# Patient Record
Sex: MALE | Race: ASIAN | NOT HISPANIC OR LATINO | URBAN - METROPOLITAN AREA
[De-identification: names, ages, dates, MRNs, and addresses within clinical notes are randomized per-mention and may not be internally consistent; named-entity substitution may affect disease eponyms.]

---

## 2024-05-31 ENCOUNTER — INPATIENT (INPATIENT)
Facility: HOSPITAL | Age: 74
LOS: 2 days | Discharge: HOME | DRG: 684 | End: 2024-06-03
Attending: INTERNAL MEDICINE | Admitting: STUDENT IN AN ORGANIZED HEALTH CARE EDUCATION/TRAINING PROGRAM
Payer: MEDICARE

## 2024-05-31 VITALS
SYSTOLIC BLOOD PRESSURE: 160 MMHG | HEART RATE: 83 BPM | HEIGHT: 67 IN | OXYGEN SATURATION: 95 % | RESPIRATION RATE: 16 BRPM | TEMPERATURE: 98 F | DIASTOLIC BLOOD PRESSURE: 83 MMHG | WEIGHT: 210.1 LBS

## 2024-05-31 DIAGNOSIS — Z79.02 LONG TERM (CURRENT) USE OF ANTITHROMBOTICS/ANTIPLATELETS: ICD-10-CM

## 2024-05-31 DIAGNOSIS — I44.0 ATRIOVENTRICULAR BLOCK, FIRST DEGREE: ICD-10-CM

## 2024-05-31 DIAGNOSIS — G25.81 RESTLESS LEGS SYNDROME: ICD-10-CM

## 2024-05-31 DIAGNOSIS — N18.4 CHRONIC KIDNEY DISEASE, STAGE 4 (SEVERE): ICD-10-CM

## 2024-05-31 DIAGNOSIS — Z79.84 LONG TERM (CURRENT) USE OF ORAL HYPOGLYCEMIC DRUGS: ICD-10-CM

## 2024-05-31 DIAGNOSIS — I25.10 ATHEROSCLEROTIC HEART DISEASE OF NATIVE CORONARY ARTERY WITHOUT ANGINA PECTORIS: ICD-10-CM

## 2024-05-31 DIAGNOSIS — I44.7 LEFT BUNDLE-BRANCH BLOCK, UNSPECIFIED: ICD-10-CM

## 2024-05-31 DIAGNOSIS — E11.22 TYPE 2 DIABETES MELLITUS WITH DIABETIC CHRONIC KIDNEY DISEASE: ICD-10-CM

## 2024-05-31 DIAGNOSIS — N17.9 ACUTE KIDNEY FAILURE, UNSPECIFIED: ICD-10-CM

## 2024-05-31 DIAGNOSIS — I12.9 HYPERTENSIVE CHRONIC KIDNEY DISEASE WITH STAGE 1 THROUGH STAGE 4 CHRONIC KIDNEY DISEASE, OR UNSPECIFIED CHRONIC KIDNEY DISEASE: ICD-10-CM

## 2024-05-31 DIAGNOSIS — N28.1 CYST OF KIDNEY, ACQUIRED: ICD-10-CM

## 2024-05-31 DIAGNOSIS — K59.00 CONSTIPATION, UNSPECIFIED: ICD-10-CM

## 2024-05-31 LAB
ALBUMIN SERPL ELPH-MCNC: 4.3 G/DL — SIGNIFICANT CHANGE UP (ref 3.5–5.2)
ALP SERPL-CCNC: 48 U/L — SIGNIFICANT CHANGE UP (ref 30–115)
ALT FLD-CCNC: 18 U/L — SIGNIFICANT CHANGE UP (ref 0–41)
ANION GAP SERPL CALC-SCNC: 13 MMOL/L — SIGNIFICANT CHANGE UP (ref 7–14)
APPEARANCE UR: CLEAR — SIGNIFICANT CHANGE UP
AST SERPL-CCNC: 21 U/L — SIGNIFICANT CHANGE UP (ref 0–41)
BASOPHILS # BLD AUTO: 0.03 K/UL — SIGNIFICANT CHANGE UP (ref 0–0.2)
BASOPHILS NFR BLD AUTO: 0.3 % — SIGNIFICANT CHANGE UP (ref 0–1)
BILIRUB SERPL-MCNC: 0.2 MG/DL — SIGNIFICANT CHANGE UP (ref 0.2–1.2)
BILIRUB UR-MCNC: NEGATIVE — SIGNIFICANT CHANGE UP
BUN SERPL-MCNC: 49 MG/DL — HIGH (ref 10–20)
CALCIUM SERPL-MCNC: 9.5 MG/DL — SIGNIFICANT CHANGE UP (ref 8.4–10.4)
CHLORIDE SERPL-SCNC: 102 MMOL/L — SIGNIFICANT CHANGE UP (ref 98–110)
CO2 SERPL-SCNC: 24 MMOL/L — SIGNIFICANT CHANGE UP (ref 17–32)
COLOR SPEC: YELLOW — SIGNIFICANT CHANGE UP
CREAT SERPL-MCNC: 2.7 MG/DL — HIGH (ref 0.7–1.5)
DIFF PNL FLD: NEGATIVE — SIGNIFICANT CHANGE UP
EGFR: 24 ML/MIN/1.73M2 — LOW
EOSINOPHIL # BLD AUTO: 0.2 K/UL — SIGNIFICANT CHANGE UP (ref 0–0.7)
EOSINOPHIL NFR BLD AUTO: 2.2 % — SIGNIFICANT CHANGE UP (ref 0–8)
GLUCOSE BLDC GLUCOMTR-MCNC: 174 MG/DL — HIGH (ref 70–99)
GLUCOSE SERPL-MCNC: 132 MG/DL — HIGH (ref 70–99)
GLUCOSE UR QL: 500 MG/DL
HCT VFR BLD CALC: 33.2 % — LOW (ref 42–52)
HGB BLD-MCNC: 10.7 G/DL — LOW (ref 14–18)
IMM GRANULOCYTES NFR BLD AUTO: 0.9 % — HIGH (ref 0.1–0.3)
KETONES UR-MCNC: NEGATIVE MG/DL — SIGNIFICANT CHANGE UP
LEUKOCYTE ESTERASE UR-ACNC: NEGATIVE — SIGNIFICANT CHANGE UP
LYMPHOCYTES # BLD AUTO: 1.33 K/UL — SIGNIFICANT CHANGE UP (ref 1.2–3.4)
LYMPHOCYTES # BLD AUTO: 15 % — LOW (ref 20.5–51.1)
MCHC RBC-ENTMCNC: 30.3 PG — SIGNIFICANT CHANGE UP (ref 27–31)
MCHC RBC-ENTMCNC: 32.2 G/DL — SIGNIFICANT CHANGE UP (ref 32–37)
MCV RBC AUTO: 94.1 FL — HIGH (ref 80–94)
MONOCYTES # BLD AUTO: 0.58 K/UL — SIGNIFICANT CHANGE UP (ref 0.1–0.6)
MONOCYTES NFR BLD AUTO: 6.5 % — SIGNIFICANT CHANGE UP (ref 1.7–9.3)
NEUTROPHILS # BLD AUTO: 6.67 K/UL — HIGH (ref 1.4–6.5)
NEUTROPHILS NFR BLD AUTO: 75.1 % — SIGNIFICANT CHANGE UP (ref 42.2–75.2)
NITRITE UR-MCNC: NEGATIVE — SIGNIFICANT CHANGE UP
NRBC # BLD: 0 /100 WBCS — SIGNIFICANT CHANGE UP (ref 0–0)
PH UR: 7 — SIGNIFICANT CHANGE UP (ref 5–8)
PLATELET # BLD AUTO: 236 K/UL — SIGNIFICANT CHANGE UP (ref 130–400)
PMV BLD: 10.3 FL — SIGNIFICANT CHANGE UP (ref 7.4–10.4)
POTASSIUM SERPL-MCNC: 4.3 MMOL/L — SIGNIFICANT CHANGE UP (ref 3.5–5)
POTASSIUM SERPL-SCNC: 4.3 MMOL/L — SIGNIFICANT CHANGE UP (ref 3.5–5)
PROT SERPL-MCNC: 6.7 G/DL — SIGNIFICANT CHANGE UP (ref 6–8)
PROT UR-MCNC: 100 MG/DL
RBC # BLD: 3.53 M/UL — LOW (ref 4.7–6.1)
RBC # FLD: 13.3 % — SIGNIFICANT CHANGE UP (ref 11.5–14.5)
SODIUM SERPL-SCNC: 139 MMOL/L — SIGNIFICANT CHANGE UP (ref 135–146)
SODIUM UR-SCNC: 57 MMOL/L — SIGNIFICANT CHANGE UP
SP GR SPEC: 1.01 — SIGNIFICANT CHANGE UP (ref 1–1.03)
UROBILINOGEN FLD QL: 0.2 MG/DL — SIGNIFICANT CHANGE UP (ref 0.2–1)
WBC # BLD: 8.89 K/UL — SIGNIFICANT CHANGE UP (ref 4.8–10.8)
WBC # FLD AUTO: 8.89 K/UL — SIGNIFICANT CHANGE UP (ref 4.8–10.8)

## 2024-05-31 PROCEDURE — 76770 US EXAM ABDO BACK WALL COMP: CPT

## 2024-05-31 PROCEDURE — 93970 EXTREMITY STUDY: CPT | Mod: 26

## 2024-05-31 PROCEDURE — 84156 ASSAY OF PROTEIN URINE: CPT

## 2024-05-31 PROCEDURE — 71045 X-RAY EXAM CHEST 1 VIEW: CPT | Mod: 26

## 2024-05-31 PROCEDURE — G0378: CPT

## 2024-05-31 PROCEDURE — 85027 COMPLETE CBC AUTOMATED: CPT

## 2024-05-31 PROCEDURE — 73600 X-RAY EXAM OF ANKLE: CPT | Mod: RT

## 2024-05-31 PROCEDURE — 99223 1ST HOSP IP/OBS HIGH 75: CPT

## 2024-05-31 PROCEDURE — 84443 ASSAY THYROID STIM HORMONE: CPT

## 2024-05-31 PROCEDURE — 84100 ASSAY OF PHOSPHORUS: CPT

## 2024-05-31 PROCEDURE — 84155 ASSAY OF PROTEIN SERUM: CPT

## 2024-05-31 PROCEDURE — 81001 URINALYSIS AUTO W/SCOPE: CPT

## 2024-05-31 PROCEDURE — 82330 ASSAY OF CALCIUM: CPT

## 2024-05-31 PROCEDURE — 82310 ASSAY OF CALCIUM: CPT

## 2024-05-31 PROCEDURE — 84133 ASSAY OF URINE POTASSIUM: CPT

## 2024-05-31 PROCEDURE — 84300 ASSAY OF URINE SODIUM: CPT

## 2024-05-31 PROCEDURE — 83935 ASSAY OF URINE OSMOLALITY: CPT

## 2024-05-31 PROCEDURE — 73590 X-RAY EXAM OF LOWER LEG: CPT | Mod: RT

## 2024-05-31 PROCEDURE — 82962 GLUCOSE BLOOD TEST: CPT

## 2024-05-31 PROCEDURE — 83036 HEMOGLOBIN GLYCOSYLATED A1C: CPT

## 2024-05-31 PROCEDURE — 83970 ASSAY OF PARATHORMONE: CPT

## 2024-05-31 PROCEDURE — 83735 ASSAY OF MAGNESIUM: CPT

## 2024-05-31 PROCEDURE — 99285 EMERGENCY DEPT VISIT HI MDM: CPT

## 2024-05-31 PROCEDURE — 73560 X-RAY EXAM OF KNEE 1 OR 2: CPT | Mod: RT

## 2024-05-31 PROCEDURE — 83540 ASSAY OF IRON: CPT

## 2024-05-31 PROCEDURE — 83550 IRON BINDING TEST: CPT

## 2024-05-31 PROCEDURE — 84540 ASSAY OF URINE/UREA-N: CPT

## 2024-05-31 PROCEDURE — 86334 IMMUNOFIX E-PHORESIS SERUM: CPT

## 2024-05-31 PROCEDURE — 80048 BASIC METABOLIC PNL TOTAL CA: CPT

## 2024-05-31 PROCEDURE — 82570 ASSAY OF URINE CREATININE: CPT

## 2024-05-31 PROCEDURE — 84165 PROTEIN E-PHORESIS SERUM: CPT

## 2024-05-31 PROCEDURE — 80053 COMPREHEN METABOLIC PANEL: CPT

## 2024-05-31 PROCEDURE — 85379 FIBRIN DEGRADATION QUANT: CPT

## 2024-05-31 PROCEDURE — 82784 ASSAY IGA/IGD/IGG/IGM EACH: CPT

## 2024-05-31 PROCEDURE — 36415 COLL VENOUS BLD VENIPUNCTURE: CPT

## 2024-05-31 RX ORDER — DOXAZOSIN MESYLATE 4 MG
4 TABLET ORAL DAILY
Refills: 0 | Status: DISCONTINUED | OUTPATIENT
Start: 2024-05-31 | End: 2024-06-03

## 2024-05-31 RX ORDER — SODIUM CHLORIDE 9 MG/ML
1000 INJECTION, SOLUTION INTRAVENOUS
Refills: 0 | Status: DISCONTINUED | OUTPATIENT
Start: 2024-05-31 | End: 2024-06-03

## 2024-05-31 RX ORDER — BRIMONIDINE TARTRATE 2 MG/MG
1 SOLUTION/ DROPS OPHTHALMIC DAILY
Refills: 0 | Status: DISCONTINUED | OUTPATIENT
Start: 2024-05-31 | End: 2024-06-03

## 2024-05-31 RX ORDER — PREDNISOLONE SODIUM PHOSPHATE 1 %
1 DROPS OPHTHALMIC (EYE) DAILY
Refills: 0 | Status: DISCONTINUED | OUTPATIENT
Start: 2024-05-31 | End: 2024-06-03

## 2024-05-31 RX ORDER — CLOPIDOGREL BISULFATE 75 MG/1
75 TABLET, FILM COATED ORAL DAILY
Refills: 0 | Status: DISCONTINUED | OUTPATIENT
Start: 2024-06-01 | End: 2024-06-03

## 2024-05-31 RX ORDER — DEXTROSE 10 % IN WATER 10 %
125 INTRAVENOUS SOLUTION INTRAVENOUS ONCE
Refills: 0 | Status: DISCONTINUED | OUTPATIENT
Start: 2024-05-31 | End: 2024-06-03

## 2024-05-31 RX ORDER — SODIUM CHLORIDE 9 MG/ML
1000 INJECTION INTRAMUSCULAR; INTRAVENOUS; SUBCUTANEOUS ONCE
Refills: 0 | Status: COMPLETED | OUTPATIENT
Start: 2024-05-31 | End: 2024-05-31

## 2024-05-31 RX ORDER — HYDRALAZINE HCL 50 MG
100 TABLET ORAL EVERY 8 HOURS
Refills: 0 | Status: DISCONTINUED | OUTPATIENT
Start: 2024-05-31 | End: 2024-06-03

## 2024-05-31 RX ORDER — INSULIN LISPRO 100/ML
VIAL (ML) SUBCUTANEOUS AT BEDTIME
Refills: 0 | Status: DISCONTINUED | OUTPATIENT
Start: 2024-05-31 | End: 2024-06-03

## 2024-05-31 RX ORDER — DEXTROSE 50 % IN WATER 50 %
15 SYRINGE (ML) INTRAVENOUS ONCE
Refills: 0 | Status: DISCONTINUED | OUTPATIENT
Start: 2024-05-31 | End: 2024-06-03

## 2024-05-31 RX ORDER — ACETAMINOPHEN 500 MG
650 TABLET ORAL EVERY 6 HOURS
Refills: 0 | Status: DISCONTINUED | OUTPATIENT
Start: 2024-05-31 | End: 2024-06-03

## 2024-05-31 RX ORDER — INSULIN LISPRO 100/ML
VIAL (ML) SUBCUTANEOUS
Refills: 0 | Status: DISCONTINUED | OUTPATIENT
Start: 2024-05-31 | End: 2024-06-03

## 2024-05-31 RX ORDER — HEPARIN SODIUM 5000 [USP'U]/ML
5000 INJECTION INTRAVENOUS; SUBCUTANEOUS EVERY 8 HOURS
Refills: 0 | Status: DISCONTINUED | OUTPATIENT
Start: 2024-05-31 | End: 2024-06-03

## 2024-05-31 RX ORDER — DEXTROSE 50 % IN WATER 50 %
12.5 SYRINGE (ML) INTRAVENOUS ONCE
Refills: 0 | Status: DISCONTINUED | OUTPATIENT
Start: 2024-05-31 | End: 2024-06-03

## 2024-05-31 RX ORDER — DEXTROSE 50 % IN WATER 50 %
25 SYRINGE (ML) INTRAVENOUS ONCE
Refills: 0 | Status: DISCONTINUED | OUTPATIENT
Start: 2024-05-31 | End: 2024-06-03

## 2024-05-31 RX ORDER — GLUCAGON INJECTION, SOLUTION 0.5 MG/.1ML
1 INJECTION, SOLUTION SUBCUTANEOUS ONCE
Refills: 0 | Status: DISCONTINUED | OUTPATIENT
Start: 2024-05-31 | End: 2024-06-03

## 2024-05-31 RX ORDER — ATORVASTATIN CALCIUM 80 MG/1
40 TABLET, FILM COATED ORAL AT BEDTIME
Refills: 0 | Status: DISCONTINUED | OUTPATIENT
Start: 2024-05-31 | End: 2024-06-03

## 2024-05-31 RX ADMIN — SODIUM CHLORIDE 1000 MILLILITER(S): 9 INJECTION INTRAMUSCULAR; INTRAVENOUS; SUBCUTANEOUS at 17:39

## 2024-05-31 NOTE — ED PROVIDER NOTE - OBJECTIVE STATEMENT
Chart(s)/Patient/Physician/Provider 75 yo male with a pmh of htn, cad, dm, ckd sent in for rosalind. pt states to note R foot swelling for 2 day and SOB at night. pt states he is producing urine. pt denies any other symptoms including fevers, chill, headache, recent illness/travel, cough, abdominal pain, chest pain.

## 2024-05-31 NOTE — ED PROVIDER NOTE - PHYSICAL EXAMINATION
Gen: NAD, AOx3  Head: NCAT  HEENT: PERRL, oral mucosa moist, normal conjunctiva, oropharynx clear without exudate or erythema  Lung: CTAB, no respiratory distress, no wheezing, rales, rhonchi  CV: normal s1/s2, rrr, Normal perfusion, pulses 2+ throughout  Abd: soft, NTND, no CVA tenderness  Genitourinary: no pelvic tenderness  MSK: No edema, no visible deformities, full range of motion in all 4 extremities  Neuro: CN II-XII grossly intact, No focal neurologic deficits   Skin: No rash   Psych: normal affect

## 2024-05-31 NOTE — H&P ADULT - HISTORY OF PRESENT ILLNESS
74yoM hx htn, CAD, DM, CKD here for JOSE and RLE pain. Pt sent from PCP for abnormal creatinine, son to bring lab work in AM currently unable to give baseline value or most recent outpt lab values. Pt also has been experiencing R calf pains and swelling for two days as well as some shortness of breath overnight.     Labs significant for Hg 10.7, MCV 94.1, Cr 2.7, BUN 49    Imaging: CXR low volume but clear, VA duplex LE negative for DVT per ED    EKst degree AVB, incomplete LBBB    Pt ordered for 1L NS bolus by ED    Vitals  T(C): 36.4 (24 @ 20:18), Max: 36.7 (24 @ 16:21)  T(F): 97.5 (24 @ 20:18), Max: 98.1 (24 @ 16:21)  HR: 76 (24 @ 20:18) (76 - 83)  BP: 160/88 (24 @ 20:18) (160/83 - 160/88)  RR: 19 (24 @ 20:18) (16 - 19)  SpO2: 95% (24 @ 20:18) (95% - 95%)  Wt(kg): --    Review of Systems  CONSTITUTIONAL:  No weakness, fevers or chills  EYES/ENT:  No visual changes;  No vertigo or throat pain   NECK:  No pain or stiffness  RESPIRATORY:  No cough, wheezing, hemoptysis; No shortness of breath  CARDIOVASCULAR:  No chest pain or palpitations  GASTROINTESTINAL:  No abdominal or epigastric pain. No nausea, vomiting, or hematemesis; No diarrhea or constipation. No melena or hematochezia.  GENITOURINARY:  No dysuria, frequency or hematuria  MUSCULOSKELETAL: Right calf tightness/pain  NEUROLOGICAL:  No numbness or weakness  SKIN:  No itching, rashes      Physical Exam  GENERAL: AAOx4. Well-nourished,sitting at edge of bed appearing in no acute distress  HEENT: No FNDs, atraumatic, normocephalic, moist mucus membranes  LUNGS: Clear to auscultation bilaterally  HEART: S1/S2. No heaves or thrills  ABD: Soft, non-tender, non-distended.  EXT/NEURO: TTP over R posterior LEStrength, sensation, ROM grossly intact  SKIN: No breaks, erythema, edema   74yoM hx htn, CAD, DM, CKD here for JOSE and RLE pain. Pt sent from PCP for abnormal creatinine, son to bring lab work in AM currently unable to give baseline value or most recent outpt lab values. When questioned, pt uncertain of lab vaules but said that he was told his function has been "level 4" for the past three months but is now "level 5". Pt also has been experiencing R calf pains and swelling for two days, denies falls, trauma.    Labs significant for Hg 10.7, MCV 94.1, Cr 2.7, BUN 49    Imaging: CXR low volume but clear, VA duplex LE negative for DVT per ED    EKst degree AVB, incomplete LBBB    Pt ordered for 1L NS bolus by ED    Vitals  T(C): 36.4 (-- @ 20:18), Max: 36.7 (-24 @ 16:21)  T(F): 97.5 (- @ 20:18), Max: 98.1 (-24 @ 16:21)  HR: 76 (-24 @ 20:18) (76 - 83)  BP: 160/88 (--24 @ 20:18) (160/83 - 160/88)  RR: 19 (-24 @ 20:18) (16 - 19)  SpO2: 95% (-24 @ 20:18) (95% - 95%)  Wt(kg): --    Review of Systems  CONSTITUTIONAL:  No weakness, fevers or chills  EYES/ENT:  No visual changes;  No vertigo or throat pain   NECK:  No pain or stiffness  RESPIRATORY:  No cough, wheezing, hemoptysis; No shortness of breath  CARDIOVASCULAR:  No chest pain or palpitations  GASTROINTESTINAL:  No abdominal or epigastric pain. No nausea, vomiting, or hematemesis; No diarrhea or constipation. No melena or hematochezia.  GENITOURINARY:  No dysuria, frequency or hematuria  MUSCULOSKELETAL: Right calf tightness/pain  NEUROLOGICAL:  No numbness or weakness  SKIN:  No itching, rashes      Physical Exam  GENERAL: AAOx4. Well-nourished,sitting at edge of bed appearing in no acute distress  HEENT: No FNDs, atraumatic, normocephalic, moist mucus membranes  LUNGS: Clear to auscultation bilaterally  HEART: S1/S2. No heaves or thrills  ABD: Distended but soft, non-tender  EXT/NEURO: TTP over R posterior calf. No pain with ROM. Strength, sensation, ROM grossly intact  SKIN: 2-3cm skin lesion overlying xyphoid process of chest. Asymmetrical, uneven border, multi color, unraised

## 2024-05-31 NOTE — H&P ADULT - NSHPLABSRESULTS_GEN_ALL_CORE
CBC Full  -  ( 31 May 2024 17:40 )  WBC Count : 8.89 K/uL  RBC Count : 3.53 M/uL  Hemoglobin : 10.7 g/dL  Hematocrit : 33.2 %  Platelet Count - Automated : 236 K/uL  Mean Cell Volume : 94.1 fL  Mean Cell Hemoglobin : 30.3 pg  Mean Cell Hemoglobin Concentration : 32.2 g/dL  Auto Neutrophil # : 6.67 K/uL  Auto Lymphocyte # : 1.33 K/uL  Auto Monocyte # : 0.58 K/uL  Auto Eosinophil # : 0.20 K/uL  Auto Basophil # : 0.03 K/uL  Auto Neutrophil % : 75.1 %  Auto Lymphocyte % : 15.0 %  Auto Monocyte % : 6.5 %  Auto Eosinophil % : 2.2 %  Auto Basophil % : 0.3 %        139  |  102  |  49<H>  ----------------------------<  132<H>  4.3   |  24  |  2.7<H>    Ca    9.5      31 May 2024 17:40    TPro  6.7  /  Alb  4.3  /  TBili  0.2  /  DBili  x   /  AST  21  /  ALT  18  /  AlkPhos  48      LIVER FUNCTIONS - ( 31 May 2024 17:40 )  Alb: 4.3 g/dL / Pro: 6.7 g/dL / ALK PHOS: 48 U/L / ALT: 18 U/L / AST: 21 U/L / GGT: x           Urinalysis Basic - ( 31 May 2024 20:06 )    Color: Yellow / Appearance: Clear / S.012 / pH: x  Gluc: x / Ketone: Negative mg/dL  / Bili: Negative / Urobili: 0.2 mg/dL   Blood: x / Protein: 100 mg/dL / Nitrite: Negative   Leuk Esterase: Negative / RBC: 0 /HPF / WBC 0 /HPF   Sq Epi: x / Non Sq Epi: 0 /HPF / Bacteria: Negative /HPF

## 2024-05-31 NOTE — H&P ADULT - ATTENDING COMMENTS
74yoM hx htn, CAD, DM, CKD here for JOSE and RLE pain.    Agree  with assessment  except for changes below.   Vital Signs Last 24 Hrs  T(C): 36.4 (31 May 2024 20:18), Max: 36.7 (31 May 2024 16:21)  T(F): 97.5 (31 May 2024 20:18), Max: 98.1 (31 May 2024 16:21)  HR: 76 (31 May 2024 20:18) (76 - 83)  BP: 160/88 (31 May 2024 20:18) (160/83 - 160/88)  BP(mean): --  RR: 19 (31 May 2024 20:18) (16 - 19)  SpO2: 95% (31 May 2024 20:18) (95% - 95%)    Parameters below as of 31 May 2024 20:18  Patient On (Oxygen Delivery Method): room air      IMPRESSION  JOSE  Baseline  Unknown CKD   Baseline creatinine: Unknown   Creatinine today 2.7 , Consider Renal US,  Avoid nephrotoxic agents, Monitor BUN/creatinine, Send  Urine Lytes   Holding Farxiga, losartan, other PO anti-glycemics for now  s/p IVF    RLE pain  - Pain  control   -mainly with palpation and walking  -f/u VA duplx  -f/u XRay    Hx CAD  Hx Essential Hypertension  -confirm med rec, hydralazine and doxazosin for htn for now  - Hold  Losartan   -c/w Plavix 74yoM hx htn, CAD, DM, CKD here for JOSE and RLE pain.    Agree  with assessment  except for changes below.   Vital Signs Last 24 Hrs  T(C): 36.4 (31 May 2024 20:18), Max: 36.7 (31 May 2024 16:21)  T(F): 97.5 (31 May 2024 20:18), Max: 98.1 (31 May 2024 16:21)  HR: 76 (31 May 2024 20:18) (76 - 83)  BP: 160/88 (31 May 2024 20:18) (160/83 - 160/88)  BP(mean): --  RR: 19 (31 May 2024 20:18) (16 - 19)  SpO2: 95% (31 May 2024 20:18) (95% - 95%)    Parameters below as of 31 May 2024 20:18  Patient On (Oxygen Delivery Method): room air    PHYSICAL EXAM  GENERAL: NAD,  HEAD:  NCAT, EOMI, MM  NECK: Supple, Nontender  NERVOUS SYSTEM:  AAOx3, NFD  CHEST/LUNG: +bs b/l, No wheezing   HEART: +s1s2 RRR  ABDOMEN: soft, NT/ND  EXTREMITIES:  pp, no edema, NO tendernes on palpation lower Extremity  SKIN: age related skin changes       IMPRESSION  JOSE  Baseline  Unknown CKD   Baseline creatinine: Unknown   Creatinine today 2.7 , Consider Renal US,  Avoid nephrotoxic agents, Monitor BUN/creatinine, Send  Urine Lytes   Holding Farxiga, losartan, other PO anti-glycemics for now  s/p IVF    RLE pain  - Pain  control   -mainly with palpation and walking  -f/u VA duplx  -f/u XRay    Hx CAD  Hx Essential Hypertension  -confirm med rec, hydralazine and doxazosin for htn for now  - Hold  Losartan   -c/w Plavix    Seen on 05/31

## 2024-05-31 NOTE — ED PROVIDER NOTE - CLINICAL SUMMARY MEDICAL DECISION MAKING FREE TEXT BOX
74-year-old male history of hypertension, CAD, diabetes, CKD sent to the ED by his outpatient doctor for JOSE.  Reportedly his physician wanted patient admitted for further workup.  Patient also noting right foot swelling for 2 days and states that he feels somewhat short of breath at night.  Made good urine output.  No other associated symptoms.    Agree with exam as documented by PA above.  Additionally there is no edema to his lower extremities contrary to his complaint of this.    Chest x-ray was clear.  Labs were obtained which showed elevated creatinine.  Potassium not markedly elevated.  A venous duplex was obtained which did not show any DVT.  Patient was admitted to evaluate his JOSE and manage.

## 2024-05-31 NOTE — PATIENT PROFILE ADULT - FUNCTIONAL SCREEN CURRENT LEVEL: COMMUNICATION, MLM
Pt BIBA from a bar for AMS. Pt admits to alcohol use, denies drug use. Pt vomited 1x PTA. No visible s/s of trauma. 0 = understands/communicates without difficulty

## 2024-05-31 NOTE — ED ADULT NURSE NOTE - NSFALLRISKINTERV_ED_ALL_ED
Assistance OOB with selected safe patient handling equipment if applicable/Assistance with ambulation/Communicate fall risk and risk factors to all staff, patient, and family/Monitor gait and stability/Provide visual cue: yellow wristband, yellow gown, etc/Reinforce activity limits and safety measures with patient and family/Call bell, personal items and telephone in reach/Instruct patient to call for assistance before getting out of bed/chair/stretcher/Non-slip footwear applied when patient is off stretcher/[place] to call system/Physically safe environment - no spills, clutter or unnecessary equipment/Purposeful Proactive Rounding/Room/bathroom lighting operational, light cord in reach

## 2024-05-31 NOTE — H&P ADULT - ASSESSMENT
74yoM hx htn, CAD, DM, CKD here for JOSE and RLE pain.    #JOSE on unknown baseline CKD  -f/u urine studies, RBUS    #CAD  #Essential Hypertension  -c/w home medications as tolerated    #DVT ppx  -HSQ  -VA duplx  -D-dimer    #GI ppx  -N/A    #Diet  #Diabetes Mellitus  -dash/tlc | CC  -fluid restrict 1500cc    #Activity  -IAT 74yoM hx htn, CAD, DM, CKD here for JOSE and RLE pain.    **Med rec partial completed with pt, confirmed most recent prescriptions 5/29/24 were started, Pt xuan Mcguire [phone] to bring labs and med list in AM**  #JOSE on unknown baseline CKD  -speaking with pt, appears he is atleast CKD 4 and outpt dr concerned he is now CKD 5  -collateral can be obtained from outpt nephro Bharathi Henderson [phone], service nephro eval for now  -holding farxiga, losartan, other PO antiglycemics for now  -f/u urine studies, RBUS    #RLE pain  -mainly with palpation and walking  -f/u VA duplx  -f/u XRay    #CAD  #Essential Hypertension  -confirm med rec, hydralazine and doxazosin for htn for now  -c/w plavix    #DVT ppx  -HSQ  -VA duplx  -D-dimer    #GI ppx  -N/A    #Diet  #Diabetes Mellitus  -dash/tlc | CC  -fluid restrict 1500cc    #Activity  -IAT

## 2024-06-01 LAB
A1C WITH ESTIMATED AVERAGE GLUCOSE RESULT: 7.1 % — HIGH (ref 4–5.6)
ALBUMIN SERPL ELPH-MCNC: 4.4 G/DL — SIGNIFICANT CHANGE UP (ref 3.5–5.2)
ALP SERPL-CCNC: 49 U/L — SIGNIFICANT CHANGE UP (ref 30–115)
ALT FLD-CCNC: 16 U/L — SIGNIFICANT CHANGE UP (ref 0–41)
ANION GAP SERPL CALC-SCNC: 12 MMOL/L — SIGNIFICANT CHANGE UP (ref 7–14)
AST SERPL-CCNC: 18 U/L — SIGNIFICANT CHANGE UP (ref 0–41)
BILIRUB SERPL-MCNC: 0.3 MG/DL — SIGNIFICANT CHANGE UP (ref 0.2–1.2)
BUN SERPL-MCNC: 44 MG/DL — HIGH (ref 10–20)
CALCIUM SERPL-MCNC: 9.3 MG/DL — SIGNIFICANT CHANGE UP (ref 8.4–10.5)
CALCIUM SERPL-MCNC: 9.6 MG/DL — SIGNIFICANT CHANGE UP (ref 8.4–10.5)
CHLORIDE SERPL-SCNC: 109 MMOL/L — SIGNIFICANT CHANGE UP (ref 98–110)
CO2 SERPL-SCNC: 25 MMOL/L — SIGNIFICANT CHANGE UP (ref 17–32)
CREAT ?TM UR-MCNC: 38 MG/DL — SIGNIFICANT CHANGE UP
CREAT SERPL-MCNC: 2.5 MG/DL — HIGH (ref 0.7–1.5)
D DIMER BLD IA.RAPID-MCNC: 250 NG/ML DDU — HIGH
EGFR: 27 ML/MIN/1.73M2 — LOW
ESTIMATED AVERAGE GLUCOSE: 157 MG/DL — HIGH (ref 68–114)
GLUCOSE BLDC GLUCOMTR-MCNC: 115 MG/DL — HIGH (ref 70–99)
GLUCOSE BLDC GLUCOMTR-MCNC: 131 MG/DL — HIGH (ref 70–99)
GLUCOSE BLDC GLUCOMTR-MCNC: 172 MG/DL — HIGH (ref 70–99)
GLUCOSE BLDC GLUCOMTR-MCNC: 209 MG/DL — HIGH (ref 70–99)
GLUCOSE SERPL-MCNC: 105 MG/DL — HIGH (ref 70–99)
HCT VFR BLD CALC: 33.2 % — LOW (ref 42–52)
HGB BLD-MCNC: 10.9 G/DL — LOW (ref 14–18)
MAGNESIUM SERPL-MCNC: 2.2 MG/DL — SIGNIFICANT CHANGE UP (ref 1.8–2.4)
MCHC RBC-ENTMCNC: 30.5 PG — SIGNIFICANT CHANGE UP (ref 27–31)
MCHC RBC-ENTMCNC: 32.8 G/DL — SIGNIFICANT CHANGE UP (ref 32–37)
MCV RBC AUTO: 93 FL — SIGNIFICANT CHANGE UP (ref 80–94)
NRBC # BLD: 0 /100 WBCS — SIGNIFICANT CHANGE UP (ref 0–0)
OSMOLALITY UR: 469 MOS/KG — SIGNIFICANT CHANGE UP (ref 50–1200)
PHOSPHATE SERPL-MCNC: 3.5 MG/DL — SIGNIFICANT CHANGE UP (ref 2.1–4.9)
PLATELET # BLD AUTO: 237 K/UL — SIGNIFICANT CHANGE UP (ref 130–400)
PMV BLD: 10.6 FL — HIGH (ref 7.4–10.4)
POTASSIUM SERPL-MCNC: 4.3 MMOL/L — SIGNIFICANT CHANGE UP (ref 3.5–5)
POTASSIUM SERPL-SCNC: 4.3 MMOL/L — SIGNIFICANT CHANGE UP (ref 3.5–5)
POTASSIUM UR-SCNC: 24 MMOL/L — SIGNIFICANT CHANGE UP
PROT ?TM UR-MCNC: 98 MG/DLG/24H — SIGNIFICANT CHANGE UP
PROT SERPL-MCNC: 6.6 G/DL — SIGNIFICANT CHANGE UP (ref 6–8.3)
PROT SERPL-MCNC: 6.9 G/DL — SIGNIFICANT CHANGE UP (ref 6–8)
PROT/CREAT UR-RTO: 2.6 RATIO — HIGH (ref 0–0.2)
PTH-INTACT FLD-MCNC: 40 PG/ML — SIGNIFICANT CHANGE UP (ref 15–65)
RBC # BLD: 3.57 M/UL — LOW (ref 4.7–6.1)
RBC # FLD: 13.2 % — SIGNIFICANT CHANGE UP (ref 11.5–14.5)
SODIUM SERPL-SCNC: 146 MMOL/L — SIGNIFICANT CHANGE UP (ref 135–146)
TSH SERPL-MCNC: 1.27 UIU/ML — SIGNIFICANT CHANGE UP (ref 0.27–4.2)
UUN UR-MCNC: 662 MG/DL — SIGNIFICANT CHANGE UP
WBC # BLD: 6.21 K/UL — SIGNIFICANT CHANGE UP (ref 4.8–10.8)
WBC # FLD AUTO: 6.21 K/UL — SIGNIFICANT CHANGE UP (ref 4.8–10.8)

## 2024-06-01 PROCEDURE — 76770 US EXAM ABDO BACK WALL COMP: CPT | Mod: 26

## 2024-06-01 PROCEDURE — 73560 X-RAY EXAM OF KNEE 1 OR 2: CPT | Mod: 26,RT

## 2024-06-01 PROCEDURE — 99232 SBSQ HOSP IP/OBS MODERATE 35: CPT

## 2024-06-01 PROCEDURE — 73590 X-RAY EXAM OF LOWER LEG: CPT | Mod: 26,RT

## 2024-06-01 PROCEDURE — 73600 X-RAY EXAM OF ANKLE: CPT | Mod: 26,RT

## 2024-06-01 RX ORDER — AMLODIPINE BESYLATE 2.5 MG/1
1 TABLET ORAL
Refills: 0 | DISCHARGE

## 2024-06-01 RX ORDER — ALLOPURINOL 300 MG
100 TABLET ORAL DAILY
Refills: 0 | Status: DISCONTINUED | OUTPATIENT
Start: 2024-06-01 | End: 2024-06-03

## 2024-06-01 RX ORDER — SEMAGLUTIDE 0.68 MG/ML
1 INJECTION, SOLUTION SUBCUTANEOUS
Refills: 0 | DISCHARGE

## 2024-06-01 RX ORDER — TAMSULOSIN HYDROCHLORIDE 0.4 MG/1
1 CAPSULE ORAL
Refills: 0 | DISCHARGE

## 2024-06-01 RX ORDER — NEBIVOLOL HYDROCHLORIDE 5 MG/1
1 TABLET ORAL
Refills: 0 | DISCHARGE

## 2024-06-01 RX ORDER — DOXAZOSIN MESYLATE 4 MG
1 TABLET ORAL
Refills: 0 | DISCHARGE

## 2024-06-01 RX ORDER — ATORVASTATIN CALCIUM 80 MG/1
1 TABLET, FILM COATED ORAL
Refills: 0 | DISCHARGE

## 2024-06-01 RX ORDER — ALLOPURINOL 300 MG
1 TABLET ORAL
Refills: 0 | DISCHARGE

## 2024-06-01 RX ORDER — HYDRALAZINE HCL 50 MG
1 TABLET ORAL
Refills: 0 | DISCHARGE

## 2024-06-01 RX ORDER — SODIUM CHLORIDE 9 MG/ML
1000 INJECTION, SOLUTION INTRAVENOUS
Refills: 0 | Status: DISCONTINUED | OUTPATIENT
Start: 2024-06-01 | End: 2024-06-02

## 2024-06-01 RX ORDER — AMLODIPINE BESYLATE 2.5 MG/1
10 TABLET ORAL DAILY
Refills: 0 | Status: DISCONTINUED | OUTPATIENT
Start: 2024-06-01 | End: 2024-06-03

## 2024-06-01 RX ORDER — DAPAGLIFLOZIN 10 MG/1
1 TABLET, FILM COATED ORAL
Refills: 0 | DISCHARGE

## 2024-06-01 RX ORDER — LINAGLIPTIN 5 MG/1
1 TABLET, FILM COATED ORAL
Refills: 0 | DISCHARGE

## 2024-06-01 RX ORDER — SODIUM CHLORIDE 9 MG/ML
1000 INJECTION INTRAMUSCULAR; INTRAVENOUS; SUBCUTANEOUS
Refills: 0 | Status: DISCONTINUED | OUTPATIENT
Start: 2024-06-01 | End: 2024-06-01

## 2024-06-01 RX ORDER — PIOGLITAZONE HYDROCHLORIDE 15 MG/1
1 TABLET ORAL
Refills: 0 | DISCHARGE

## 2024-06-01 RX ORDER — PREDNISOLONE SODIUM PHOSPHATE 1 %
1 DROPS OPHTHALMIC (EYE)
Refills: 0 | DISCHARGE

## 2024-06-01 RX ORDER — BRIMONIDINE TARTRATE 2 MG/MG
1 SOLUTION/ DROPS OPHTHALMIC
Refills: 0 | DISCHARGE

## 2024-06-01 RX ORDER — CLOPIDOGREL BISULFATE 75 MG/1
1 TABLET, FILM COATED ORAL
Refills: 0 | DISCHARGE

## 2024-06-01 RX ORDER — SODIUM BICARBONATE 1 MEQ/ML
650 SYRINGE (ML) INTRAVENOUS THREE TIMES A DAY
Refills: 0 | Status: DISCONTINUED | OUTPATIENT
Start: 2024-06-01 | End: 2024-06-03

## 2024-06-01 RX ORDER — ISOSORBIDE DINITRATE 5 MG/1
3 TABLET ORAL
Refills: 0 | DISCHARGE

## 2024-06-01 RX ORDER — PRAMIPEXOLE DIHYDROCHLORIDE 0.12 MG/1
0.12 TABLET ORAL AT BEDTIME
Refills: 0 | Status: DISCONTINUED | OUTPATIENT
Start: 2024-06-01 | End: 2024-06-03

## 2024-06-01 RX ADMIN — Medication 100 MILLIGRAM(S): at 00:03

## 2024-06-01 RX ADMIN — Medication 100 MILLIGRAM(S): at 05:41

## 2024-06-01 RX ADMIN — Medication 100 MILLIGRAM(S): at 13:41

## 2024-06-01 RX ADMIN — Medication 650 MILLIGRAM(S): at 21:44

## 2024-06-01 RX ADMIN — Medication 650 MILLIGRAM(S): at 08:01

## 2024-06-01 RX ADMIN — Medication 650 MILLIGRAM(S): at 00:01

## 2024-06-01 RX ADMIN — Medication 650 MILLIGRAM(S): at 22:30

## 2024-06-01 RX ADMIN — Medication 650 MILLIGRAM(S): at 21:45

## 2024-06-01 RX ADMIN — PRAMIPEXOLE DIHYDROCHLORIDE 0.12 MILLIGRAM(S): 0.12 TABLET ORAL at 21:50

## 2024-06-01 RX ADMIN — Medication 1: at 17:33

## 2024-06-01 RX ADMIN — Medication 650 MILLIGRAM(S): at 07:31

## 2024-06-01 RX ADMIN — SODIUM CHLORIDE 100 MILLILITER(S): 9 INJECTION, SOLUTION INTRAVENOUS at 11:54

## 2024-06-01 RX ADMIN — ATORVASTATIN CALCIUM 40 MILLIGRAM(S): 80 TABLET, FILM COATED ORAL at 21:44

## 2024-06-01 RX ADMIN — Medication 100 MILLIGRAM(S): at 11:26

## 2024-06-01 RX ADMIN — Medication 4 MILLIGRAM(S): at 05:41

## 2024-06-01 RX ADMIN — HEPARIN SODIUM 5000 UNIT(S): 5000 INJECTION INTRAVENOUS; SUBCUTANEOUS at 05:41

## 2024-06-01 RX ADMIN — Medication 650 MILLIGRAM(S): at 00:42

## 2024-06-01 RX ADMIN — SODIUM CHLORIDE 100 MILLILITER(S): 9 INJECTION, SOLUTION INTRAVENOUS at 23:04

## 2024-06-01 RX ADMIN — SODIUM CHLORIDE 100 MILLILITER(S): 9 INJECTION INTRAMUSCULAR; INTRAVENOUS; SUBCUTANEOUS at 10:58

## 2024-06-01 RX ADMIN — HEPARIN SODIUM 5000 UNIT(S): 5000 INJECTION INTRAVENOUS; SUBCUTANEOUS at 13:41

## 2024-06-01 RX ADMIN — HEPARIN SODIUM 5000 UNIT(S): 5000 INJECTION INTRAVENOUS; SUBCUTANEOUS at 21:44

## 2024-06-01 RX ADMIN — Medication 100 MILLIGRAM(S): at 21:44

## 2024-06-01 RX ADMIN — CLOPIDOGREL BISULFATE 75 MILLIGRAM(S): 75 TABLET, FILM COATED ORAL at 11:26

## 2024-06-01 RX ADMIN — Medication 650 MILLIGRAM(S): at 13:41

## 2024-06-01 RX ADMIN — Medication 1 DROP(S): at 11:27

## 2024-06-01 RX ADMIN — Medication 2: at 11:55

## 2024-06-01 RX ADMIN — BRIMONIDINE TARTRATE 1 DROP(S): 2 SOLUTION/ DROPS OPHTHALMIC at 11:59

## 2024-06-01 NOTE — PROGRESS NOTE ADULT - SUBJECTIVE AND OBJECTIVE BOX
AR GLEZ  74y  [facility]      Patient is a 74y old  Male who presents with a chief complaint of JOSE on CKD (01 Jun 2024 05:48)      INTERVAL HPI/OVERNIGHT EVENTS:        REVIEW OF SYSTEMS:        FAMILY HISTORY:    T(C): 36.9 (06-01-24 @ 05:21), Max: 36.9 (06-01-24 @ 05:21)  HR: 53 (06-01-24 @ 05:21) (53 - 83)  BP: 156/85 (06-01-24 @ 05:21) (156/85 - 160/88)  RR: 18 (06-01-24 @ 05:21) (16 - 19)  SpO2: 97% (06-01-24 @ 05:21) (95% - 97%)  Wt(kg): --Vital Signs Last 24 Hrs  T(C): 36.9 (01 Jun 2024 05:21), Max: 36.9 (01 Jun 2024 05:21)  T(F): 98.4 (01 Jun 2024 05:21), Max: 98.4 (01 Jun 2024 05:21)  HR: 53 (01 Jun 2024 05:21) (53 - 83)  BP: 156/85 (01 Jun 2024 05:21) (156/85 - 160/88)  BP(mean): 108 (01 Jun 2024 05:21) (108 - 108)  RR: 18 (01 Jun 2024 05:21) (16 - 19)  SpO2: 97% (01 Jun 2024 05:21) (95% - 97%)    Parameters below as of 01 Jun 2024 05:21  Patient On (Oxygen Delivery Method): room air        PHYSICAL EXAM:  GENERAL: NAD, well-groomed, well-developed  HEAD:  Atraumatic, Normocephalic  EYES: EOMI, PERRLA, conjunctiva and sclera clear  ENMT: No tonsillar erythema, exudates, or enlargement; Moist mucous membranes, Good dentition, No lesions  NECK: Supple, No JVD, Normal thyroid  NERVOUS SYSTEM:  Alert & Oriented X3, Good concentration; Motor Strength 5/5 B/L upper and lower extremities; DTRs 2+ intact and symmetric  PULM: Clear to auscultation bilaterally  CARDIAC: Regular rate and rhythm; No murmurs, rubs, or gallops  GI: Soft, Nontender, Nondistended; Bowel sounds present  EXTREMITIES:  2+ Peripheral Pulses, No clubbing, cyanosis, or edema  LYMPH: No lymphadenopathy noted  SKIN: No rashes or lesions    Consultant(s) Notes Reviewed:  [x ] YES  [ ] NO  Care Discussed with Consultants/Other Providers [ x] YES  [ ] NO    LABS:                            10.9   6.21  )-----------( 237      ( 01 Jun 2024 07:20 )             33.2   06-01    146  |  109  |  44<H>  ----------------------------<  105<H>  4.3   |  25  |  2.5<H>    Ca    9.6      01 Jun 2024 07:20  Phos  3.5     06-01  Mg     2.2     06-01    TPro  6.9  /  Alb  4.4  /  TBili  0.3  /  DBili  x   /  AST  18  /  ALT  16  /  AlkPhos  49  06-01            Urinalysis with Rflx Culture (collected 31 May 2024 20:06)      acetaminophen     Tablet .. 650 milliGRAM(s) Oral every 6 hours PRN  atorvastatin 40 milliGRAM(s) Oral at bedtime  brimonidine 0.2% Ophthalmic Solution 1 Drop(s) Both EYES daily  clopidogrel Tablet 75 milliGRAM(s) Oral daily  dextrose 10% Bolus 125 milliLiter(s) IV Bolus once  dextrose 5%. 1000 milliLiter(s) IV Continuous <Continuous>  dextrose 5%. 1000 milliLiter(s) IV Continuous <Continuous>  dextrose 50% Injectable 25 Gram(s) IV Push once  dextrose 50% Injectable 12.5 Gram(s) IV Push once  dextrose Oral Gel 15 Gram(s) Oral once PRN  doxazosin 4 milliGRAM(s) Oral daily  glucagon  Injectable 1 milliGRAM(s) IntraMuscular once  heparin   Injectable 5000 Unit(s) SubCutaneous every 8 hours  hydrALAZINE 100 milliGRAM(s) Oral every 8 hours  insulin lispro (ADMELOG) corrective regimen sliding scale   SubCutaneous three times a day before meals  insulin lispro (ADMELOG) corrective regimen sliding scale   SubCutaneous at bedtime  prednisoLONE acetate 1% Suspension 1 Drop(s) Both EYES daily        74 year old Male, with hx htn, CAD, DM, CKD here for JOSE and RLE pain.          1. JOSE  on top of ckd stage 4 vs chronic kidney disease stage IV (seems more chronic as there is no metabolic acidosis or hyperkalemia)   * Baseline creatinine: Unknown   Creatinine today 2.7 ,   - Avoid nephrotoxic agents, Monitor BUN/creatinine, Send  Urine Lytes   - Holding Farxiga, losartan, other PO anti-glycemics for now  - IVF   - Renal US:pending     2. RLE pain  - Pain  control   -mainly with palpation and walking  - Venous duplex:pending   - Right leg x-ray:WNL     3.Hx CAD/Hx Essential Hypertension  -confirm med rec, hydralazine and doxazosin for htn for now  - Hold  Losartan   -c/w Plavix    4. DVT/GI px        AR GLEZ  74y  [facility]      Patient is a 74y old  Male who presents with a chief complaint of JOSE on CKD (01 Jun 2024 05:48)      INTERVAL HPI/OVERNIGHT EVENTS:    patient feels well.  he has no complains except leg pain with abn movement at night  no other events   agreeable with the plan of care         FAMILY HISTORY:    T(C): 36.9 (06-01-24 @ 05:21), Max: 36.9 (06-01-24 @ 05:21)  HR: 53 (06-01-24 @ 05:21) (53 - 83)  BP: 156/85 (06-01-24 @ 05:21) (156/85 - 160/88)  RR: 18 (06-01-24 @ 05:21) (16 - 19)  SpO2: 97% (06-01-24 @ 05:21) (95% - 97%)  Wt(kg): --Vital Signs Last 24 Hrs  T(C): 36.9 (01 Jun 2024 05:21), Max: 36.9 (01 Jun 2024 05:21)  T(F): 98.4 (01 Jun 2024 05:21), Max: 98.4 (01 Jun 2024 05:21)  HR: 53 (01 Jun 2024 05:21) (53 - 83)  BP: 156/85 (01 Jun 2024 05:21) (156/85 - 160/88)  BP(mean): 108 (01 Jun 2024 05:21) (108 - 108)  RR: 18 (01 Jun 2024 05:21) (16 - 19)  SpO2: 97% (01 Jun 2024 05:21) (95% - 97%)    Parameters below as of 01 Jun 2024 05:21  Patient On (Oxygen Delivery Method): room air        PHYSICAL EXAM:  GENERAL: NAD, well-groomed, well-developed  PULM: Clear to auscultation bilaterally  CARDIAC: Regular rate and rhythm; No murmurs, rubs, or gallops  GI: Soft, Nontender, Nondistended; Bowel sounds present  EXTREMITIES:  2+ Peripheral Pulses,       Consultant(s) Notes Reviewed:  [x ] YES  [ ] NO  Care Discussed with Consultants/Other Providers [ x] YES  [ ] NO    LABS:                            10.9   6.21  )-----------( 237      ( 01 Jun 2024 07:20 )             33.2   06-01    146  |  109  |  44<H>  ----------------------------<  105<H>  4.3   |  25  |  2.5<H>    Ca    9.6      01 Jun 2024 07:20  Phos  3.5     06-01  Mg     2.2     06-01    TPro  6.9  /  Alb  4.4  /  TBili  0.3  /  DBili  x   /  AST  18  /  ALT  16  /  AlkPhos  49  06-01            Urinalysis with Rflx Culture (collected 31 May 2024 20:06)      acetaminophen     Tablet .. 650 milliGRAM(s) Oral every 6 hours PRN  atorvastatin 40 milliGRAM(s) Oral at bedtime  brimonidine 0.2% Ophthalmic Solution 1 Drop(s) Both EYES daily  clopidogrel Tablet 75 milliGRAM(s) Oral daily  dextrose 10% Bolus 125 milliLiter(s) IV Bolus once  dextrose 5%. 1000 milliLiter(s) IV Continuous <Continuous>  dextrose 5%. 1000 milliLiter(s) IV Continuous <Continuous>  dextrose 50% Injectable 25 Gram(s) IV Push once  dextrose 50% Injectable 12.5 Gram(s) IV Push once  dextrose Oral Gel 15 Gram(s) Oral once PRN  doxazosin 4 milliGRAM(s) Oral daily  glucagon  Injectable 1 milliGRAM(s) IntraMuscular once  heparin   Injectable 5000 Unit(s) SubCutaneous every 8 hours  hydrALAZINE 100 milliGRAM(s) Oral every 8 hours  insulin lispro (ADMELOG) corrective regimen sliding scale   SubCutaneous three times a day before meals  insulin lispro (ADMELOG) corrective regimen sliding scale   SubCutaneous at bedtime  prednisoLONE acetate 1% Suspension 1 Drop(s) Both EYES daily        74 year old Male, with hx htn, CAD, DM, CKD here for JOSE and RLE pain.      1. JOSE  on top of ckd stage 4  * seems there is a  chronic component as there is no metabolic acidosis or hyperkalemia  * Baseline creatinine: 1.8  Creatinine today 2.7 ,   - Avoid nephrotoxic agents,  - Holding Farxiga, losartan, other PO anti-glycemics for now  - IVF with LR at 100ml/hr   - Renal US:pending   - Nephro consult:pending     2. RLE pain associated with abn movement at night   * seems more restless leg syndrome   - Iron profile :pending   - Start pramipexole HS   - Venous duplex: No DVT   - Right leg x-ray:WNL     3.Hx CAD/Hx Essential Hypertension  -confirm med rec, hydralazine and doxazosin for htn for now  - Hold  Losartan   -c/w Plavix    4. DVT/GI px

## 2024-06-01 NOTE — PROGRESS NOTE ADULT - SUBJECTIVE AND OBJECTIVE BOX
----------Daily Progress Note----------    HISTORY OF PRESENT ILLNESS:  Patient is a 74y old Male who presents with a chief complaint of JOSE on CKD (31 May 2024 21:44)    Currently admitted to medicine with the primary diagnosis of Acute kidney injury superimposed on CKD       Today is hospital day 1d.     INTERVAL HOSPITAL COURSE / OVERNIGHT EVENTS:  O/N events:      24 hr events:      Patient was examined and seen at bedside. This morning he is resting comfortably in bed and reports no new issues or overnight events.     Review of Systems: Otherwise unremarkable     <<<<<PAST MEDICAL & SURGICAL HISTORY>>>>>    ALLERGIES  No Known Allergies      Home Medications:  atorvastatin 40 mg oral tablet: 1 tab(s) orally once a day (at bedtime) (31 May 2024 23:18)  brimonidine 0.15% ophthalmic solution: 1 drop(s) in each affected eye once a day (31 May 2024 23:18)  dapagliflozin 10 mg oral tablet: 1 tab(s) orally once a day (31 May 2024 23:18)  doxazosin 4 mg oral tablet: 1 tab(s) orally once a day (31 May 2024 23:17)  hydrALAZINE 100 mg oral tablet: 1 tab(s) orally 3 times a day (31 May 2024 23:17)  losartan 100 mg oral tablet: 1 tab(s) orally once a day (31 May 2024 23:18)  pioglitazone 15 mg oral tablet: 1 tab(s) orally once a day (31 May 2024 23:18)  Plavix 75 mg oral tablet: 1 tab(s) orally once a day (31 May 2024 23:18)  Prednisol 1% ophthalmic solution: 1 drop(s) in each affected eye once a day (31 May 2024 23:18)  Rybelsus 3 mg oral tablet: 1 tab(s) orally once a day (31 May 2024 23:18)        MEDICATIONS  STANDING MEDICATIONS  atorvastatin 40 milliGRAM(s) Oral at bedtime  brimonidine 0.2% Ophthalmic Solution 1 Drop(s) Both EYES daily  clopidogrel Tablet 75 milliGRAM(s) Oral daily  dextrose 10% Bolus 125 milliLiter(s) IV Bolus once  dextrose 5%. 1000 milliLiter(s) IV Continuous <Continuous>  dextrose 5%. 1000 milliLiter(s) IV Continuous <Continuous>  dextrose 50% Injectable 25 Gram(s) IV Push once  dextrose 50% Injectable 12.5 Gram(s) IV Push once  doxazosin 4 milliGRAM(s) Oral daily  glucagon  Injectable 1 milliGRAM(s) IntraMuscular once  heparin   Injectable 5000 Unit(s) SubCutaneous every 8 hours  hydrALAZINE 100 milliGRAM(s) Oral every 8 hours  insulin lispro (ADMELOG) corrective regimen sliding scale   SubCutaneous three times a day before meals  insulin lispro (ADMELOG) corrective regimen sliding scale   SubCutaneous at bedtime  prednisoLONE acetate 1% Suspension 1 Drop(s) Both EYES daily    PRN MEDICATIONS  acetaminophen     Tablet .. 650 milliGRAM(s) Oral every 6 hours PRN  dextrose Oral Gel 15 Gram(s) Oral once PRN    VITALS:  T(F): 98.4  HR: 53  BP: 156/85  RR: 18  SpO2: 97%    <<<<<PHYSICAL EXAM>>>>>  GENERAL: Well developed, well nourished and in no acute distress. Resting comfortably in bed.  HEENT: Normocephalic, atraumatic, mucous membranes moist, EOMI, PERRLA, bilateral sclera anicteric, no conjunctival injection  Neck: Supple, non-tender, no lymphadenopathy.  PULMONARY: Clear to auscultation bilaterally. No rales, rhonchi, or wheezing.  CARDIOVASCULAR: Regular rate and rhythm, S1-S2, no murmurs  GASTROINTESTINAL: Soft, non-tender, non-distended, no guarding.  RENAL: No CVA tenderness.  SKIN/EXTREMITIES: No clubbing or edema  NEUROLOGIC/MUSCULOSKELETAL: AOx4, grossly moving all extremities, no focal deficits.    <<<<<LABS>>>>>                        10.7   8.89  )-----------( 236      ( 31 May 2024 17:40 )             33.2         139  |  102  |  49<H>  ----------------------------<  132<H>  4.3   |  24  |  2.7<H>    Ca    9.5      31 May 2024 17:40    TPro  6.7  /  Alb  4.3  /  TBili  0.2  /  DBili  x   /  AST  21  /  ALT  18  /  AlkPhos  48        Urinalysis Basic - ( 31 May 2024 20:06 )    Color: Yellow / Appearance: Clear / S.012 / pH: x  Gluc: x / Ketone: Negative mg/dL  / Bili: Negative / Urobili: 0.2 mg/dL   Blood: x / Protein: 100 mg/dL / Nitrite: Negative   Leuk Esterase: Negative / RBC: 0 /HPF / WBC 0 /HPF   Sq Epi: x / Non Sq Epi: 0 /HPF / Bacteria: Negative /HPF            Urinalysis with Rflx Culture (collected 31 May 2024 20:06)    [id number]        <<<<<RADIOLOGY>>>>>          ----------------------------------------------------------------------------------------------------------------------------------------------------------------------------------------------- ----------Daily Progress Note----------    HISTORY OF PRESENT ILLNESS:  Patient is a 73 yo M w/PMH of HTN, CAD, DM, and CKD who was sent in by PCP for abnormal creatinine w/symptoms of R calf pains and swelling for two days without fall or trauma with ED vitals notable for HTN to 160/83 here for JOSE and RLE pain. Pt sent from PCP for abnormal creatinine, son to bring lab work in AM currently unable to give baseline value or most recent outpt lab values. When questioned, pt uncertain of lab vaules but said that he was told his function has been "level 4" for the past three months but is now "level 5". Pt also has been experiencing R calf pains and swelling for two days, denies falls, trauma.    Labs significant for Hg 10.7, MCV 94.1, Cr 2.7, BUN 49    Imaging: CXR low volume but clear, VA duplex LE negative for DVT per ED    EKst degree AVB, incomplete LBBB    Pt ordered for 1L NS bolus by ED       Today is hospital day 1d.     INTERVAL HOSPITAL COURSE / OVERNIGHT EVENTS:  O/N events:  None    24 hr events:  None    Patient was examined and seen at bedside.    Review of Systems: Otherwise unremarkable     <<<<<PAST MEDICAL & SURGICAL HISTORY>>>>>    ALLERGIES  No Known Allergies      Home Medications:  atorvastatin 40 mg oral tablet: 1 tab(s) orally once a day (at bedtime) (31 May 2024 23:18)  brimonidine 0.15% ophthalmic solution: 1 drop(s) in each affected eye once a day (31 May 2024 23:18)  dapagliflozin 10 mg oral tablet: 1 tab(s) orally once a day (31 May 2024 23:18)  doxazosin 4 mg oral tablet: 1 tab(s) orally once a day (31 May 2024 23:17)  hydrALAZINE 100 mg oral tablet: 1 tab(s) orally 3 times a day (31 May 2024 23:17)  losartan 100 mg oral tablet: 1 tab(s) orally once a day (31 May 2024 23:18)  pioglitazone 15 mg oral tablet: 1 tab(s) orally once a day (31 May 2024 23:18)  Plavix 75 mg oral tablet: 1 tab(s) orally once a day (31 May 2024 23:18)  Prednisol 1% ophthalmic solution: 1 drop(s) in each affected eye once a day (31 May 2024 23:18)  Rybelsus 3 mg oral tablet: 1 tab(s) orally once a day (31 May 2024 23:18)        MEDICATIONS  STANDING MEDICATIONS  atorvastatin 40 milliGRAM(s) Oral at bedtime  brimonidine 0.2% Ophthalmic Solution 1 Drop(s) Both EYES daily  clopidogrel Tablet 75 milliGRAM(s) Oral daily  dextrose 10% Bolus 125 milliLiter(s) IV Bolus once  dextrose 5%. 1000 milliLiter(s) IV Continuous <Continuous>  dextrose 5%. 1000 milliLiter(s) IV Continuous <Continuous>  dextrose 50% Injectable 25 Gram(s) IV Push once  dextrose 50% Injectable 12.5 Gram(s) IV Push once  doxazosin 4 milliGRAM(s) Oral daily  glucagon  Injectable 1 milliGRAM(s) IntraMuscular once  heparin   Injectable 5000 Unit(s) SubCutaneous every 8 hours  hydrALAZINE 100 milliGRAM(s) Oral every 8 hours  insulin lispro (ADMELOG) corrective regimen sliding scale   SubCutaneous three times a day before meals  insulin lispro (ADMELOG) corrective regimen sliding scale   SubCutaneous at bedtime  prednisoLONE acetate 1% Suspension 1 Drop(s) Both EYES daily    PRN MEDICATIONS  acetaminophen     Tablet .. 650 milliGRAM(s) Oral every 6 hours PRN  dextrose Oral Gel 15 Gram(s) Oral once PRN    VITALS:  T(F): 98.4  HR: 53  BP: 156/85  RR: 18  SpO2: 97%    <<<<<PHYSICAL EXAM>>>>>  GENERAL: Well developed, well nourished and in no acute distress. Resting comfortably in bed.  HEENT: Normocephalic, atraumatic, mucous membranes moist, EOMI, PERRLA, bilateral sclera anicteric, no conjunctival injection  Neck: Supple, non-tender, no lymphadenopathy.  PULMONARY: Clear to auscultation bilaterally. No rales, rhonchi, or wheezing.  CARDIOVASCULAR: Regular rate and rhythm, S1-S2, no murmurs  GASTROINTESTINAL: Soft, non-tender, non-distended, no guarding.  RENAL: No CVA tenderness.  SKIN/EXTREMITIES: No clubbing or edema  NEUROLOGIC/MUSCULOSKELETAL: AOx4, grossly moving all extremities, no focal deficits.    <<<<<LABS>>>>>                        10.7   8.89  )-----------( 236      ( 31 May 2024 17:40 )             33.2         139  |  102  |  49<H>  ----------------------------<  132<H>  4.3   |  24  |  2.7<H>    Ca    9.5      31 May 2024 17:40    TPro  6.7  /  Alb  4.3  /  TBili  0.2  /  DBili  x   /  AST  21  /  ALT  18  /  AlkPhos  48        Urinalysis Basic - ( 31 May 2024 20:06 )    Color: Yellow / Appearance: Clear / S.012 / pH: x  Gluc: x / Ketone: Negative mg/dL  / Bili: Negative / Urobili: 0.2 mg/dL   Blood: x / Protein: 100 mg/dL / Nitrite: Negative   Leuk Esterase: Negative / RBC: 0 /HPF / WBC 0 /HPF   Sq Epi: x / Non Sq Epi: 0 /HPF / Bacteria: Negative /HPF            Urinalysis with Rflx Culture (collected 31 May 2024 20:06)    [id number]        <<<<<RADIOLOGY>>>>>          ----------------------------------------------------------------------------------------------------------------------------------------------------------------------------------------------- ----------Daily Progress Note----------    HISTORY OF PRESENT ILLNESS:  Patient is a 73 yo M w/PMH of HTN, CAD, DM, and CKD who was sent in by PCP for abnormal creatinine w/symptoms of R calf pains and swelling for two days without fall or trauma with ED vitals notable for HTN to 160/83, labs notable for Cr 2.7, BUN 49, and UA negative, CXR unremarkable, LE venous duplex negative, and EKG showing 1st degree AV block w/incomplete LBBB. Patient was given 1 L NS in the ED and admitted for work up and management of JOSE on CKD.     Today is hospital day 1d.     INTERVAL HOSPITAL COURSE / OVERNIGHT EVENTS:  O/N events:  None    24 hr events:  None    Patient was examined and seen at bedside.    Review of Systems: Otherwise unremarkable     <<<<<PAST MEDICAL & SURGICAL HISTORY>>>>>    ALLERGIES  No Known Allergies      Home Medications:  atorvastatin 40 mg oral tablet: 1 tab(s) orally once a day (at bedtime) (31 May 2024 23:18)  brimonidine 0.15% ophthalmic solution: 1 drop(s) in each affected eye once a day (31 May 2024 23:18)  dapagliflozin 10 mg oral tablet: 1 tab(s) orally once a day (31 May 2024 23:18)  doxazosin 4 mg oral tablet: 1 tab(s) orally once a day (31 May 2024 23:17)  hydrALAZINE 100 mg oral tablet: 1 tab(s) orally 3 times a day (31 May 2024 23:17)  losartan 100 mg oral tablet: 1 tab(s) orally once a day (31 May 2024 23:18)  pioglitazone 15 mg oral tablet: 1 tab(s) orally once a day (31 May 2024 23:18)  Plavix 75 mg oral tablet: 1 tab(s) orally once a day (31 May 2024 23:18)  Prednisol 1% ophthalmic solution: 1 drop(s) in each affected eye once a day (31 May 2024 23:18)  Rybelsus 3 mg oral tablet: 1 tab(s) orally once a day (31 May 2024 23:18)        MEDICATIONS  STANDING MEDICATIONS  atorvastatin 40 milliGRAM(s) Oral at bedtime  brimonidine 0.2% Ophthalmic Solution 1 Drop(s) Both EYES daily  clopidogrel Tablet 75 milliGRAM(s) Oral daily  dextrose 10% Bolus 125 milliLiter(s) IV Bolus once  dextrose 5%. 1000 milliLiter(s) IV Continuous <Continuous>  dextrose 5%. 1000 milliLiter(s) IV Continuous <Continuous>  dextrose 50% Injectable 25 Gram(s) IV Push once  dextrose 50% Injectable 12.5 Gram(s) IV Push once  doxazosin 4 milliGRAM(s) Oral daily  glucagon  Injectable 1 milliGRAM(s) IntraMuscular once  heparin   Injectable 5000 Unit(s) SubCutaneous every 8 hours  hydrALAZINE 100 milliGRAM(s) Oral every 8 hours  insulin lispro (ADMELOG) corrective regimen sliding scale   SubCutaneous three times a day before meals  insulin lispro (ADMELOG) corrective regimen sliding scale   SubCutaneous at bedtime  prednisoLONE acetate 1% Suspension 1 Drop(s) Both EYES daily    PRN MEDICATIONS  acetaminophen     Tablet .. 650 milliGRAM(s) Oral every 6 hours PRN  dextrose Oral Gel 15 Gram(s) Oral once PRN    VITALS:  T(F): 98.4  HR: 53  BP: 156/85  RR: 18  SpO2: 97%    <<<<<PHYSICAL EXAM>>>>>  GENERAL: Well developed, well nourished and in no acute distress. Resting comfortably in bed.  HEENT: Normocephalic, atraumatic, mucous membranes moist, EOMI, PERRLA, bilateral sclera anicteric, no conjunctival injection  Neck: Supple, non-tender, no lymphadenopathy.  PULMONARY: Clear to auscultation bilaterally. No rales, rhonchi, or wheezing.  CARDIOVASCULAR: Regular rate and rhythm, S1-S2, no murmurs  GASTROINTESTINAL: Soft, non-tender, non-distended, no guarding.  RENAL: No CVA tenderness.  SKIN/EXTREMITIES: No clubbing or edema  NEUROLOGIC/MUSCULOSKELETAL: AOx4, grossly moving all extremities, no focal deficits.    <<<<<LABS>>>>>                        10.7   8.89  )-----------( 236      ( 31 May 2024 17:40 )             33.2         139  |  102  |  49<H>  ----------------------------<  132<H>  4.3   |  24  |  2.7<H>    Ca    9.5      31 May 2024 17:40    TPro  6.7  /  Alb  4.3  /  TBili  0.2  /  DBili  x   /  AST  21  /  ALT  18  /  AlkPhos  48  05-      Urinalysis Basic - ( 31 May 2024 20:06 )    Color: Yellow / Appearance: Clear / S.012 / pH: x  Gluc: x / Ketone: Negative mg/dL  / Bili: Negative / Urobili: 0.2 mg/dL   Blood: x / Protein: 100 mg/dL / Nitrite: Negative   Leuk Esterase: Negative / RBC: 0 /HPF / WBC 0 /HPF   Sq Epi: x / Non Sq Epi: 0 /HPF / Bacteria: Negative /HPF            Urinalysis with Rflx Culture (collected 31 May 2024 20:06)    [id number]        <<<<<RADIOLOGY>>>>>          ----------------------------------------------------------------------------------------------------------------------------------------------------------------------------------------------- ----------Daily Progress Note----------    HISTORY OF PRESENT ILLNESS:  Patient is a 73 yo M w/PMH of HTN, CAD, DM, and CKD who was sent in by PCP for abnormal creatinine w/symptoms of R calf pains and swelling for two days without fall or trauma with ED vitals notable for HTN to 160/83, labs notable for Cr 2.7, BUN 49, and UA negative, CXR unremarkable, LE venous duplex negative in ED, and EKG showing 1st degree AV block w/incomplete LBBB. Patient was given 1 L NS in the ED and admitted for work up and management of JOSE on CKD.     Today is hospital day 1d.     INTERVAL HOSPITAL COURSE / OVERNIGHT EVENTS:  O/N events:  None    24 hr events:  None    Patient was examined and seen at bedside.    Review of Systems: Otherwise unremarkable     <<<<<PAST MEDICAL & SURGICAL HISTORY>>>>>    ALLERGIES  No Known Allergies      Home Medications:  atorvastatin 40 mg oral tablet: 1 tab(s) orally once a day (at bedtime) (31 May 2024 23:18)  brimonidine 0.15% ophthalmic solution: 1 drop(s) in each affected eye once a day (31 May 2024 23:18)  dapagliflozin 10 mg oral tablet: 1 tab(s) orally once a day (31 May 2024 23:18)  doxazosin 4 mg oral tablet: 1 tab(s) orally once a day (31 May 2024 23:17)  hydrALAZINE 100 mg oral tablet: 1 tab(s) orally 3 times a day (31 May 2024 23:17)  losartan 100 mg oral tablet: 1 tab(s) orally once a day (31 May 2024 23:18)  pioglitazone 15 mg oral tablet: 1 tab(s) orally once a day (31 May 2024 23:18)  Plavix 75 mg oral tablet: 1 tab(s) orally once a day (31 May 2024 23:18)  Prednisol 1% ophthalmic solution: 1 drop(s) in each affected eye once a day (31 May 2024 23:18)  Rybelsus 3 mg oral tablet: 1 tab(s) orally once a day (31 May 2024 23:18)        MEDICATIONS  STANDING MEDICATIONS  atorvastatin 40 milliGRAM(s) Oral at bedtime  brimonidine 0.2% Ophthalmic Solution 1 Drop(s) Both EYES daily  clopidogrel Tablet 75 milliGRAM(s) Oral daily  dextrose 10% Bolus 125 milliLiter(s) IV Bolus once  dextrose 5%. 1000 milliLiter(s) IV Continuous <Continuous>  dextrose 5%. 1000 milliLiter(s) IV Continuous <Continuous>  dextrose 50% Injectable 25 Gram(s) IV Push once  dextrose 50% Injectable 12.5 Gram(s) IV Push once  doxazosin 4 milliGRAM(s) Oral daily  glucagon  Injectable 1 milliGRAM(s) IntraMuscular once  heparin   Injectable 5000 Unit(s) SubCutaneous every 8 hours  hydrALAZINE 100 milliGRAM(s) Oral every 8 hours  insulin lispro (ADMELOG) corrective regimen sliding scale   SubCutaneous three times a day before meals  insulin lispro (ADMELOG) corrective regimen sliding scale   SubCutaneous at bedtime  prednisoLONE acetate 1% Suspension 1 Drop(s) Both EYES daily    PRN MEDICATIONS  acetaminophen     Tablet .. 650 milliGRAM(s) Oral every 6 hours PRN  dextrose Oral Gel 15 Gram(s) Oral once PRN    VITALS:  T(F): 98.4  HR: 53  BP: 156/85  RR: 18  SpO2: 97%    <<<<<PHYSICAL EXAM>>>>>  GENERAL: Well developed, well nourished and in no acute distress. Resting comfortably in bed.  HEENT: Normocephalic, atraumatic, mucous membranes moist, EOMI, PERRLA, bilateral sclera anicteric, no conjunctival injection  Neck: Supple, non-tender, no lymphadenopathy.  PULMONARY: Clear to auscultation bilaterally. No rales, rhonchi, or wheezing.  CARDIOVASCULAR: Regular rate and rhythm, S1-S2, no murmurs  GASTROINTESTINAL: Soft, non-tender, non-distended, no guarding.  RENAL: No CVA tenderness.  SKIN/EXTREMITIES: No clubbing or edema  NEUROLOGIC/MUSCULOSKELETAL: AOx4, grossly moving all extremities, no focal deficits.    <<<<<LABS>>>>>                        10.7   8.89  )-----------( 236      ( 31 May 2024 17:40 )             33.2         139  |  102  |  49<H>  ----------------------------<  132<H>  4.3   |  24  |  2.7<H>    Ca    9.5      31 May 2024 17:40    TPro  6.7  /  Alb  4.3  /  TBili  0.2  /  DBili  x   /  AST  21  /  ALT  18  /  AlkPhos  48  05-      Urinalysis Basic - ( 31 May 2024 20:06 )    Color: Yellow / Appearance: Clear / S.012 / pH: x  Gluc: x / Ketone: Negative mg/dL  / Bili: Negative / Urobili: 0.2 mg/dL   Blood: x / Protein: 100 mg/dL / Nitrite: Negative   Leuk Esterase: Negative / RBC: 0 /HPF / WBC 0 /HPF   Sq Epi: x / Non Sq Epi: 0 /HPF / Bacteria: Negative /HPF            Urinalysis with Rflx Culture (collected 31 May 2024 20:06)    [id number]        <<<<<RADIOLOGY>>>>>          ----------------------------------------------------------------------------------------------------------------------------------------------------------------------------------------------- ----------Daily Progress Note----------    HISTORY OF PRESENT ILLNESS:  Patient is a 75 yo M w/PMH of HTN, CAD, DM, and CKD who was sent in by PCP for abnormal creatinine w/symptoms of R calf pains and swelling for two days without fall or trauma with ED vitals notable for HTN to 160/83, labs notable for Cr 2.7, BUN 49, and UA negative, CXR unremarkable, LE venous duplex negative in ED, and EKG showing 1st degree AV block w/incomplete LBBB. Patient was given 1 L NS in the ED and admitted for work up and management of JOSE on CKD.     Today is hospital day 1d.     INTERVAL HOSPITAL COURSE / OVERNIGHT EVENTS:  O/N events:  None    24 hr events:  None    Patient was examined and seen at bedside. Reports R calf pain that worsens at rest and when sleeping, reports R leg kicking at night and waking him up in pain, reports leg pain improves with walking, denies any pain in L leg, denies any increased urination or dysurea, denies any abdominal pain, endorses good appetite, reports 1 BM yesterday, denies any other issues    Review of Systems: Otherwise unremarkable     <<<<<PAST MEDICAL & SURGICAL HISTORY>>>>>    ALLERGIES  No Known Allergies      Home Medications:  atorvastatin 40 mg oral tablet: 1 tab(s) orally once a day (at bedtime) (31 May 2024 23:18)  brimonidine 0.15% ophthalmic solution: 1 drop(s) in each affected eye once a day (31 May 2024 23:18)  dapagliflozin 10 mg oral tablet: 1 tab(s) orally once a day (31 May 2024 23:18)  doxazosin 4 mg oral tablet: 1 tab(s) orally once a day (31 May 2024 23:17)  hydrALAZINE 100 mg oral tablet: 1 tab(s) orally 3 times a day (31 May 2024 23:17)  losartan 100 mg oral tablet: 1 tab(s) orally once a day (31 May 2024 23:18)  pioglitazone 15 mg oral tablet: 1 tab(s) orally once a day (31 May 2024 23:18)  Plavix 75 mg oral tablet: 1 tab(s) orally once a day (31 May 2024 23:18)  Prednisol 1% ophthalmic solution: 1 drop(s) in each affected eye once a day (31 May 2024 23:18)  Rybelsus 3 mg oral tablet: 1 tab(s) orally once a day (31 May 2024 23:18)        MEDICATIONS  STANDING MEDICATIONS  atorvastatin 40 milliGRAM(s) Oral at bedtime  brimonidine 0.2% Ophthalmic Solution 1 Drop(s) Both EYES daily  clopidogrel Tablet 75 milliGRAM(s) Oral daily  dextrose 10% Bolus 125 milliLiter(s) IV Bolus once  dextrose 5%. 1000 milliLiter(s) IV Continuous <Continuous>  dextrose 5%. 1000 milliLiter(s) IV Continuous <Continuous>  dextrose 50% Injectable 25 Gram(s) IV Push once  dextrose 50% Injectable 12.5 Gram(s) IV Push once  doxazosin 4 milliGRAM(s) Oral daily  glucagon  Injectable 1 milliGRAM(s) IntraMuscular once  heparin   Injectable 5000 Unit(s) SubCutaneous every 8 hours  hydrALAZINE 100 milliGRAM(s) Oral every 8 hours  insulin lispro (ADMELOG) corrective regimen sliding scale   SubCutaneous three times a day before meals  insulin lispro (ADMELOG) corrective regimen sliding scale   SubCutaneous at bedtime  prednisoLONE acetate 1% Suspension 1 Drop(s) Both EYES daily    PRN MEDICATIONS  acetaminophen     Tablet .. 650 milliGRAM(s) Oral every 6 hours PRN  dextrose Oral Gel 15 Gram(s) Oral once PRN    VITALS:  T(F): 98.4  HR: 53  BP: 156/85  RR: 18  SpO2: 97%    <<<<<PHYSICAL EXAM>>>>>  GENERAL: NAD  PULMONARY: Clear to auscultation bilaterally. No wheezing or crackles  CARDIOVASCULAR: Regular rate and rhythm, S1-S2, no murmurs, rubs, or gallops  GASTROINTESTINAL: Soft, non-tender, non-distended, no guarding  SKIN/EXTREMITIES: Warm, 2+ tibialis posterior pulses, no UE or LE edema  NEUROLOGIC/MUSCULOSKELETAL: AOx4, grossly moving all extremities, no focal deficits    <<<<<LABS>>>>>                        10.7   8.89  )-----------( 236      ( 31 May 2024 17:40 )             33.2     05-    139  |  102  |  49<H>  ----------------------------<  132<H>  4.3   |  24  |  2.7<H>    Ca    9.5      31 May 2024 17:40    TPro  6.7  /  Alb  4.3  /  TBili  0.2  /  DBili  x   /  AST  21  /  ALT  18  /  AlkPhos  48  05-31      Urinalysis Basic - ( 31 May 2024 20:06 )    Color: Yellow / Appearance: Clear / S.012 / pH: x  Gluc: x / Ketone: Negative mg/dL  / Bili: Negative / Urobili: 0.2 mg/dL   Blood: x / Protein: 100 mg/dL / Nitrite: Negative   Leuk Esterase: Negative / RBC: 0 /HPF / WBC 0 /HPF   Sq Epi: x / Non Sq Epi: 0 /HPF / Bacteria: Negative /HPF            Urinalysis with Rflx Culture (collected 31 May 2024 20:06)    [id number]        <<<<<RADIOLOGY>>>>>          -----------------------------------------------------------------------------------------------------------------------------------------------------------------------------------------------

## 2024-06-01 NOTE — CONSULT NOTE ADULT - SUBJECTIVE AND OBJECTIVE BOX
74yoM hx htn, CAD, DM, CKD here for JOSE and RLE pain. Pt sent from PCP for abnormal creatinine  history fro the chart     PAST HISTORY  --------------------------------------------------------------------------------  PAST MEDICAL & SURGICAL HISTORY:    FAMILY HISTORY:    PAST SOCIAL HISTORY:    ALLERGIES & MEDICATIONS  --------------------------------------------------------------------------------  Allergies    No Known Allergies    Intolerances      Standing Inpatient Medications  allopurinol 100 milliGRAM(s) Oral daily  amLODIPine   Tablet 10 milliGRAM(s) Oral daily  atorvastatin 40 milliGRAM(s) Oral at bedtime  brimonidine 0.2% Ophthalmic Solution 1 Drop(s) Both EYES daily  clopidogrel Tablet 75 milliGRAM(s) Oral daily  dextrose 10% Bolus 125 milliLiter(s) IV Bolus once  dextrose 5%. 1000 milliLiter(s) IV Continuous <Continuous>  dextrose 5%. 1000 milliLiter(s) IV Continuous <Continuous>  dextrose 50% Injectable 25 Gram(s) IV Push once  dextrose 50% Injectable 12.5 Gram(s) IV Push once  doxazosin 4 milliGRAM(s) Oral daily  glucagon  Injectable 1 milliGRAM(s) IntraMuscular once  heparin   Injectable 5000 Unit(s) SubCutaneous every 8 hours  hydrALAZINE 100 milliGRAM(s) Oral every 8 hours  insulin lispro (ADMELOG) corrective regimen sliding scale   SubCutaneous three times a day before meals  insulin lispro (ADMELOG) corrective regimen sliding scale   SubCutaneous at bedtime  lactated ringers. 1000 milliLiter(s) IV Continuous <Continuous>  pramipexole 0.125 milliGRAM(s) Oral at bedtime  prednisoLONE acetate 1% Suspension 1 Drop(s) Both EYES daily  sodium bicarbonate 650 milliGRAM(s) Oral three times a day    PRN Inpatient Medications  acetaminophen     Tablet .. 650 milliGRAM(s) Oral every 6 hours PRN  dextrose Oral Gel 15 Gram(s) Oral once PRN  .    VITALS/PHYSICAL EXAM  --------------------------------------------------------------------------------  T(C): 36.6 (06-01-24 @ 13:55), Max: 36.9 (06-01-24 @ 05:21)  HR: 76 (06-01-24 @ 13:55) (53 - 83)  BP: 158/79 (06-01-24 @ 13:55) (156/85 - 160/88)  RR: 18 (06-01-24 @ 13:55) (16 - 19)  SpO2: 97% (06-01-24 @ 13:55) (95% - 97%)  Wt(kg): --  Height (cm): 177.8 (05-31-24 @ 20:15)  Weight (kg): 97.6 (05-31-24 @ 20:15)  BMI (kg/m2): 30.9 (05-31-24 @ 20:15)  BSA (m2): 2.15 (05-31-24 @ 20:15)      05-31-24 @ 07:01  -  06-01-24 @ 07:00  --------------------------------------------------------  IN: 0 mL / OUT: 200 mL / NET: -200 mL    06-01-24 @ 07:01  -  06-01-24 @ 15:54  --------------------------------------------------------  IN: 440 mL / OUT: 0 mL / NET: 440 mL      Physical Exam:  	Gen: NAD  	Pulm: CTA B/L  	CV:  S1S2; no rub  	Abd:  soft, /nondistended  	LE:  no edema  	    LABS/STUDIES  --------------------------------------------------------------------------------              10.9   6.21  >-----------<  237      [06-01-24 @ 07:20]              33.2     146  |  109  |  44  ----------------------------<  105      [06-01-24 @ 07:20]  4.3   |  25  |  2.5        Ca     9.6     [06-01-24 @ 07:20]      Mg     2.2     [06-01-24 @ 07:20]      Phos  3.5     [06-01-24 @ 07:20]    TPro  6.9  /  Alb  4.4  /  TBili  0.3  /  DBili  x   /  AST  18  /  ALT  16  /  AlkPhos  49  [06-01-24 @ 07:20]          Creatinine Trend:  SCr 2.5 [06-01 @ 07:20]  SCr 2.7 [05-31 @ 17:40]    Urinalysis - [06-01-24 @ 07:20]      Color  / Appearance  / SG  / pH       Gluc 105 / Ketone   / Bili  / Urobili        Blood  / Protein  / Leuk Est  / Nitrite       RBC  / WBC  / Hyaline  / Gran  / Sq Epi  / Non Sq Epi  / Bacteria     Urine Creatinine 38      [05-31-24 @ 20:06]  Urine Protein 98      [05-31-24 @ 20:06]  Urine Sodium 57.0      [05-31-24 @ 20:06]  Urine Potassium 24      [06-01-24 @ 09:40]  Urine Osmolality 469      [06-01-24 @ 09:40]    TSH 1.27      [06-01-24 @ 07:20]

## 2024-06-01 NOTE — CONSULT NOTE ADULT - ASSESSMENT
74yoM hx htn, CAD, DM, CKD here for JOSE and RLE pain. Pt sent from PCP for abnormal creatinine.  ? baseline   please document UO   check renal and bladder sono   decrease sodium bicarbonate to q 12  ph at goal   BP controlled   2.5 g of pr/ ? DKD / GN w/up pending baseline cr   h/h at goal   will need to resume ARB once cr at baseline   will benefit from [id number] before dc   will follow

## 2024-06-01 NOTE — PROGRESS NOTE ADULT - ASSESSMENT
Patient is a 75 yo M w/PMH of HTN, CAD, DM, and CKD who was sent in by PCP for abnormal creatinine w/symptoms of R calf pains and swelling for two days without fall or trauma now admitted for work up and management of JOSE on CKD.    74yoM hx htn, CAD, DM, CKD here for JOSE and RLE pain.    **Med rec partial completed with pt, confirmed most recent prescriptions 5/29/24 were started, Pt xuan Mcguire [phone] to bring labs and med list in AM**  #JOSE on unknown baseline CKD  -speaking with pt, appears he is atleast CKD 4 and outpt dr concerned he is now CKD 5  -collateral can be obtained from outpt nephro Bharathi Henderson [phone], service nephro eval for now  -holding farxiga, losartan, other PO antiglycemics for now  -f/u urine studies, RBUS    #RLE pain  -mainly with palpation and walking  -f/u [facility] duplx  -f/u XRay    #CAD  #Essential Hypertension  -confirm med rec, hydralazine and doxazosin for htn for now  -c/w plavix      #MISC  - DVT ppx: Heparin SQ  - GI ppx  -HSQ  -VA duplx  -D-dimer    #GI ppx  -N/A    #Diet  #Diabetes Mellitus  -dash/tlc | CC  -fluid restrict 1500cc    #Activity  -IAT   Patient is a 73 yo M w/PMH of HTN, CAD, DM, and CKD who was sent in by PCP for abnormal creatinine w/symptoms of R calf pains and swelling for two days without fall or trauma now admitted for work up and management of JOSE on CKD.    **Med rec partial completed with pt, confirmed most recent prescriptions 5/29/24 were started, Pt xuan Mcguire [phone] to bring labs and med list in AM**    #JOSE on unknown baseline CKD  - speaking with pt, appears he is atleast CKD 4 and outpt dr concerned he is now CKD 5  - collateral can be obtained from outpt nephro Bharathi Henderson [phone], service nephro eval for now  - holding farxiga, losartan, other PO antiglycemics for now  - s/p NS 1 L bolus in ED  - f/u urine studies, RBUS  - f/u immunofixation, immunoglobulins panel, serum protein electrophoresis, and urine protein electrophoresis  - Pending nephro consult    #RLE pain  - mainly with palpation and walking  - f/u D-dimer  - f/u BL LE venous duplex  - f/u R calf and foot XR    #CAD  #Essential Hypertension  -confirm med rec, hydralazine and doxazosin for htn for now  -c/w plavix    #Diabetes Mellitus  - FS and ISS    #MISC  - DVT ppx: Heparin SQ  - GI ppx: None  - Diet: DASH/CC, fluid restrict 1.5 L  - Activity: IAT    Pending: RBUS, urine studies, nephro consult   Patient is a 75 yo M w/PMH of HTN, CAD, DM, and CKD who was sent in by PCP for abnormal creatinine w/symptoms of R calf pains and swelling for two days without fall or trauma now admitted for work up and management of JOSE on CKD.    #JOSE on unknown baseline CKD  - speaking with pt, appears he is atleast CKD 4 and outpt dr concerned he is now CKD 5  - collateral can be obtained from outpt nephro Bharathi Henderson [phone], service nephro eval for now  - holding farxiga, losartan, other PO antiglycemics for now  - s/p NS 1 L bolus in ED  - c/w NS @ 100 cc/hr  - f/u urine studies, RBUS  - f/u immunofixation, immunoglobulins panel, serum protein electrophoresis, and urine protein electrophoresis  - Pending nephro consult    #RLE pain, likely restless leg syndrome  - reports pain worse at rest and when sleeping  - d-dimer negative  - No fractures on R calf and foot XR  - f/u BL LE venous duplex  - Started pramipexole 0.125 mg nightly  - Pending iron profile    #CAD  #Essential Hypertension  -confirm med rec, hydralazine and doxazosin for htn for now  -c/w plavix    #Diabetes Mellitus  - FS and ISS    #MISC  - DVT ppx: Heparin SQ  - GI ppx: None  - Diet: DASH/CC, fluid restrict 1.5 L  - Activity: IAT    Pending: RBUS, urine studies, nephro consult, iron profile   Patient is a 75 yo M w/PMH of HTN, CAD, DM, and CKD who was sent in by PCP for abnormal creatinine w/symptoms of R calf pains and swelling for two days without fall or trauma now admitted for work up and management of JOSE on CKD.    #JOSE on unknown baseline CKD  - speaking with pt, appears he is atleast CKD 4 and outpt dr concerned he is now CKD 5  - collateral can be obtained from outpt nephro Bharathi Henderson [phone], service nephro eval for now  - holding farxiga, losartan, other PO antiglycemics for now  - s/p NS 1 L bolus in ED  - c/w NS @ 100 cc/hr  - f/u urine studies, RBUS  - f/u immunofixation, immunoglobulins panel, serum protein electrophoresis, and urine protein electrophoresis  - Pending nephro consult    #RLE pain, likely restless leg syndrome  - reports pain worse at rest and when sleeping  - d-dimer negative  - No fractures on R calf and foot XR  - f/u BL LE venous duplex  - Started pramipexole 0.125 mg nightly  - Pending iron profile    #CAD  #Essential Hypertension  - c/w home meds: hydralazine 100 mg tid, amlodipine 10 mg daily, and doxazosin 4 mg daily for htn for now  - c/w plavix    #Diabetes Mellitus  - FS and ISS  - Home meds: Pioglitazone 30 mg daily, tradjenta 5 mg daily, glipizide 10 mg daily    #MISC  - DVT ppx: Heparin SQ  - GI ppx: None  - Diet: DASH/CC, fluid restrict 1.5 L  - Activity: IAT    Pending: RBUS, urine studies, nephro consult, iron profile   Patient is a 73 yo M w/PMH of HTN, CAD, DM, and CKD who was sent in by PCP for abnormal creatinine w/symptoms of R calf pains and swelling for two days without fall or trauma now admitted for work up and management of JOSE on CKD.    #JOSE on unknown baseline CKD  - speaking with pt, appears he is atleast CKD 4 and outpt dr concerned he is now CKD 5  - collateral can be obtained from outpt nephro Bharathi Henderson [phone], service nephro eval for now  - holding farxiga, losartan, other PO antiglycemics for now  - s/p NS 1 L bolus in ED  - c/w NS @ 100 cc/hr  - f/u urine studies, RBUS  - f/u immunofixation, immunoglobulins panel, serum protein electrophoresis, and urine protein electrophoresis  - Pending nephro consult    #RLE pain, likely restless leg syndrome  - reports pain worse at rest and when sleeping  - d-dimer negative  - No fractures on R calf and foot XR  - f/u BL LE venous duplex  - Started pramipexole 0.125 mg nightly  - Pending iron profile    #CAD  #Essential Hypertension  - c/w home meds: hydralazine 100 mg tid, amlodipine 10 mg daily, and doxazosin 4 mg daily  - c/w plavix    #Diabetes Mellitus  - FS and ISS  - Home meds: Pioglitazone 30 mg daily, tradjenta 5 mg daily, glipizide 10 mg daily    #MISC  - DVT ppx: Heparin SQ  - GI ppx: None  - Diet: DASH/CC, fluid restrict 1.5 L  - Activity: IAT    Pending: RBUS, urine studies, nephro consult, iron profile

## 2024-06-02 LAB
ANION GAP SERPL CALC-SCNC: 11 MMOL/L — SIGNIFICANT CHANGE UP (ref 7–14)
BUN SERPL-MCNC: 38 MG/DL — HIGH (ref 10–20)
CALCIUM SERPL-MCNC: 9.4 MG/DL — SIGNIFICANT CHANGE UP (ref 8.4–10.5)
CHLORIDE SERPL-SCNC: 105 MMOL/L — SIGNIFICANT CHANGE UP (ref 98–110)
CO2 SERPL-SCNC: 24 MMOL/L — SIGNIFICANT CHANGE UP (ref 17–32)
CREAT SERPL-MCNC: 2.3 MG/DL — HIGH (ref 0.7–1.5)
EGFR: 29 ML/MIN/1.73M2 — LOW
GLUCOSE BLDC GLUCOMTR-MCNC: 108 MG/DL — HIGH (ref 70–99)
GLUCOSE BLDC GLUCOMTR-MCNC: 150 MG/DL — HIGH (ref 70–99)
GLUCOSE BLDC GLUCOMTR-MCNC: 153 MG/DL — HIGH (ref 70–99)
GLUCOSE BLDC GLUCOMTR-MCNC: 203 MG/DL — HIGH (ref 70–99)
GLUCOSE SERPL-MCNC: 134 MG/DL — HIGH (ref 70–99)
HCT VFR BLD CALC: 32.8 % — LOW (ref 42–52)
HGB BLD-MCNC: 10.7 G/DL — LOW (ref 14–18)
IRON SATN MFR SERPL: 112 UG/DL — SIGNIFICANT CHANGE UP (ref 35–150)
IRON SATN MFR SERPL: 41 % — SIGNIFICANT CHANGE UP (ref 15–50)
MAGNESIUM SERPL-MCNC: 1.9 MG/DL — SIGNIFICANT CHANGE UP (ref 1.8–2.4)
MCHC RBC-ENTMCNC: 30.4 PG — SIGNIFICANT CHANGE UP (ref 27–31)
MCHC RBC-ENTMCNC: 32.6 G/DL — SIGNIFICANT CHANGE UP (ref 32–37)
MCV RBC AUTO: 93.2 FL — SIGNIFICANT CHANGE UP (ref 80–94)
NRBC # BLD: 0 /100 WBCS — SIGNIFICANT CHANGE UP (ref 0–0)
PHOSPHATE SERPL-MCNC: 3.2 MG/DL — SIGNIFICANT CHANGE UP (ref 2.1–4.9)
PLATELET # BLD AUTO: 212 K/UL — SIGNIFICANT CHANGE UP (ref 130–400)
PMV BLD: 10.6 FL — HIGH (ref 7.4–10.4)
POTASSIUM SERPL-MCNC: 3.9 MMOL/L — SIGNIFICANT CHANGE UP (ref 3.5–5)
POTASSIUM SERPL-SCNC: 3.9 MMOL/L — SIGNIFICANT CHANGE UP (ref 3.5–5)
RBC # BLD: 3.52 M/UL — LOW (ref 4.7–6.1)
RBC # FLD: 13.2 % — SIGNIFICANT CHANGE UP (ref 11.5–14.5)
SODIUM SERPL-SCNC: 140 MMOL/L — SIGNIFICANT CHANGE UP (ref 135–146)
TIBC SERPL-MCNC: 274 UG/DL — SIGNIFICANT CHANGE UP (ref 220–430)
UIBC SERPL-MCNC: 162 UG/DL — SIGNIFICANT CHANGE UP (ref 110–370)
WBC # BLD: 6.84 K/UL — SIGNIFICANT CHANGE UP (ref 4.8–10.8)
WBC # FLD AUTO: 6.84 K/UL — SIGNIFICANT CHANGE UP (ref 4.8–10.8)

## 2024-06-02 PROCEDURE — 99232 SBSQ HOSP IP/OBS MODERATE 35: CPT

## 2024-06-02 RX ORDER — SODIUM CHLORIDE 9 MG/ML
1000 INJECTION, SOLUTION INTRAVENOUS
Refills: 0 | Status: DISCONTINUED | OUTPATIENT
Start: 2024-06-02 | End: 2024-06-03

## 2024-06-02 RX ORDER — DAPAGLIFLOZIN 10 MG/1
10 TABLET, FILM COATED ORAL DAILY
Refills: 0 | Status: DISCONTINUED | OUTPATIENT
Start: 2024-06-02 | End: 2024-06-03

## 2024-06-02 RX ORDER — POLYETHYLENE GLYCOL 3350 17 G/17G
17 POWDER, FOR SOLUTION ORAL DAILY
Refills: 0 | Status: DISCONTINUED | OUTPATIENT
Start: 2024-06-02 | End: 2024-06-03

## 2024-06-02 RX ORDER — DAPAGLIFLOZIN 10 MG/1
10 TABLET, FILM COATED ORAL DAILY
Refills: 0 | Status: DISCONTINUED | OUTPATIENT
Start: 2024-06-02 | End: 2024-06-02

## 2024-06-02 RX ORDER — SENNA PLUS 8.6 MG/1
2 TABLET ORAL AT BEDTIME
Refills: 0 | Status: DISCONTINUED | OUTPATIENT
Start: 2024-06-02 | End: 2024-06-03

## 2024-06-02 RX ADMIN — DAPAGLIFLOZIN 10 MILLIGRAM(S): 10 TABLET, FILM COATED ORAL at 14:41

## 2024-06-02 RX ADMIN — Medication 650 MILLIGRAM(S): at 21:16

## 2024-06-02 RX ADMIN — SENNA PLUS 2 TABLET(S): 8.6 TABLET ORAL at 21:16

## 2024-06-02 RX ADMIN — Medication 100 MILLIGRAM(S): at 11:38

## 2024-06-02 RX ADMIN — POLYETHYLENE GLYCOL 3350 17 GRAM(S): 17 POWDER, FOR SOLUTION ORAL at 14:42

## 2024-06-02 RX ADMIN — Medication 2: at 11:36

## 2024-06-02 RX ADMIN — HEPARIN SODIUM 5000 UNIT(S): 5000 INJECTION INTRAVENOUS; SUBCUTANEOUS at 21:17

## 2024-06-02 RX ADMIN — SODIUM CHLORIDE 100 MILLILITER(S): 9 INJECTION, SOLUTION INTRAVENOUS at 11:39

## 2024-06-02 RX ADMIN — ATORVASTATIN CALCIUM 40 MILLIGRAM(S): 80 TABLET, FILM COATED ORAL at 21:16

## 2024-06-02 RX ADMIN — Medication 100 MILLIGRAM(S): at 05:22

## 2024-06-02 RX ADMIN — Medication 1 DROP(S): at 11:38

## 2024-06-02 RX ADMIN — Medication 1: at 08:16

## 2024-06-02 RX ADMIN — HEPARIN SODIUM 5000 UNIT(S): 5000 INJECTION INTRAVENOUS; SUBCUTANEOUS at 05:23

## 2024-06-02 RX ADMIN — PRAMIPEXOLE DIHYDROCHLORIDE 0.12 MILLIGRAM(S): 0.12 TABLET ORAL at 21:16

## 2024-06-02 RX ADMIN — HEPARIN SODIUM 5000 UNIT(S): 5000 INJECTION INTRAVENOUS; SUBCUTANEOUS at 14:43

## 2024-06-02 RX ADMIN — CLOPIDOGREL BISULFATE 75 MILLIGRAM(S): 75 TABLET, FILM COATED ORAL at 11:37

## 2024-06-02 RX ADMIN — Medication 100 MILLIGRAM(S): at 14:41

## 2024-06-02 RX ADMIN — Medication 650 MILLIGRAM(S): at 14:41

## 2024-06-02 RX ADMIN — AMLODIPINE BESYLATE 10 MILLIGRAM(S): 2.5 TABLET ORAL at 05:23

## 2024-06-02 RX ADMIN — Medication 650 MILLIGRAM(S): at 05:23

## 2024-06-02 RX ADMIN — Medication 4 MILLIGRAM(S): at 05:23

## 2024-06-02 RX ADMIN — Medication 100 MILLIGRAM(S): at 21:16

## 2024-06-02 RX ADMIN — BRIMONIDINE TARTRATE 1 DROP(S): 2 SOLUTION/ DROPS OPHTHALMIC at 11:36

## 2024-06-02 NOTE — PROGRESS NOTE ADULT - SUBJECTIVE AND OBJECTIVE BOX
Nephrology progress note    THIS IS AN INCOMPLETE NOTE . FULL NOTE TO FOLLOW SHORTLY    Patient is seen and examined, events over the last 24 h noted .    Allergies:  No Known Allergies    Hospital Medications:   MEDICATIONS  (STANDING):  allopurinol 100 milliGRAM(s) Oral daily  amLODIPine   Tablet 10 milliGRAM(s) Oral daily  atorvastatin 40 milliGRAM(s) Oral at bedtime  brimonidine 0.2% Ophthalmic Solution 1 Drop(s) Both EYES daily  clopidogrel Tablet 75 milliGRAM(s) Oral daily  dextrose 10% Bolus 125 milliLiter(s) IV Bolus once  dextrose 5%. 1000 milliLiter(s) (100 mL/Hr) IV Continuous <Continuous>  dextrose 5%. 1000 milliLiter(s) (50 mL/Hr) IV Continuous <Continuous>  dextrose 50% Injectable 25 Gram(s) IV Push once  dextrose 50% Injectable 12.5 Gram(s) IV Push once  doxazosin 4 milliGRAM(s) Oral daily  glucagon  Injectable 1 milliGRAM(s) IntraMuscular once  heparin   Injectable 5000 Unit(s) SubCutaneous every 8 hours  hydrALAZINE 100 milliGRAM(s) Oral every 8 hours  insulin lispro (ADMELOG) corrective regimen sliding scale   SubCutaneous three times a day before meals  insulin lispro (ADMELOG) corrective regimen sliding scale   SubCutaneous at bedtime  lactated ringers. 1000 milliLiter(s) (100 mL/Hr) IV Continuous <Continuous>  pramipexole 0.125 milliGRAM(s) Oral at bedtime  prednisoLONE acetate 1% Suspension 1 Drop(s) Both EYES daily  sodium bicarbonate 650 milliGRAM(s) Oral three times a day        VITALS:  T(F): 97.9 (06-02-24 @ 05:20), Max: 97.9 (06-01-24 @ 13:55)  HR: 77 (06-02-24 @ 05:20)  BP: 176/79 (06-02-24 @ 05:20)  RR: 18 (06-02-24 @ 05:20)  SpO2: 97% (06-02-24 @ 05:20)  Wt(kg): --    05-31 @ 07:01  -  06-01 @ 07:00  --------------------------------------------------------  IN: 0 mL / OUT: 200 mL / NET: -200 mL    06-01 @ 07:01  -  06-02 @ 07:00  --------------------------------------------------------  IN: 1860 mL / OUT: 1150 mL / NET: 710 mL          PHYSICAL EXAM:  Constitutional: NAD  HEENT: anicteric sclera, oropharynx clear, MMM  Neck: No JVD  Respiratory: CTAB, no wheezes, rales or rhonchi  Cardiovascular: S1, S2, RRR  Gastrointestinal: BS+, soft, NT/ND  Extremities: No cyanosis or clubbing. No peripheral edema  :  No randall.   Skin: No rashes    LABS:  06-01    146  |  109  |  44<H>  ----------------------------<  105<H>  4.3   |  25  |  2.5<H>    Ca    9.6      01 Jun 2024 07:20  Phos  3.5     06-01  Mg     2.2     06-01    TPro  6.9  /  Alb  4.4  /  TBili  0.3  /  DBili      /  AST  18  /  ALT  16  /  AlkPhos  49  06-01                          10.7   6.84  )-----------( 212      ( 02 Jun 2024 07:10 )             32.8       Urine Studies:  Urinalysis Basic - ( 01 Jun 2024 07:20 )    Color:  / Appearance:  / SG:  / pH:   Gluc: 105 mg/dL / Ketone:   / Bili:  / Urobili:    Blood:  / Protein:  / Nitrite:    Leuk Esterase:  / RBC:  / WBC    Sq Epi:  / Non Sq Epi:  / Bacteria:       Osmolality, Random Urine: 469 mos/kg (06-01 @ 09:40)  Potassium, Random Urine: 24 mmol/L (06-01 @ 09:40)  Creatinine, Random Urine: 38 mg/dL (05-31 @ 20:06)  Protein/Creatinine Ratio Calculation: 2.6 Ratio (05-31 @ 20:06)  Sodium, Random Urine: 57.0 mmoL/L (05-31 @ 20:06)      PTH -- (Ca 9.3)      [06-01-24 @ 07:20]   40  TSH 1.27      [06-01-24 @ 07:20]          RADIOLOGY & ADDITIONAL STUDIES:   Nephrology progress note    Patient is seen and examined, events over the last 24 h noted .  Lying in bed comfortable     Allergies:  No Known Allergies    Hospital Medications:   MEDICATIONS  (STANDING):  allopurinol 100 milliGRAM(s) Oral daily  amLODIPine   Tablet 10 milliGRAM(s) Oral daily  atorvastatin 40 milliGRAM(s) Oral at bedtime  brimonidine 0.2% Ophthalmic Solution 1 Drop(s) Both EYES daily  clopidogrel Tablet 75 milliGRAM(s) Oral daily  doxazosin 4 milliGRAM(s) Oral daily  glucagon  Injectable 1 milliGRAM(s) IntraMuscular once  heparin   Injectable 5000 Unit(s) SubCutaneous every 8 hours  hydrALAZINE 100 milliGRAM(s) Oral every 8 hours  insulin lispro (ADMELOG) corrective regimen sliding scale   SubCutaneous three times a day before meals  insulin lispro (ADMELOG) corrective regimen sliding scale   SubCutaneous at bedtime  lactated ringers. 1000 milliLiter(s) (100 mL/Hr) IV Continuous <Continuous>  pramipexole 0.125 milliGRAM(s) Oral at bedtime  prednisoLONE acetate 1% Suspension 1 Drop(s) Both EYES daily  sodium bicarbonate 650 milliGRAM(s) Oral three times a day        VITALS:  T(F): 97.9 (06-02-24 @ 05:20), Max: 97.9 (06-01-24 @ 13:55)  HR: 77 (06-02-24 @ 05:20)  BP: 176/79 (06-02-24 @ 05:20)  RR: 18 (06-02-24 @ 05:20)  SpO2: 97% (06-02-24 @ 05:20)    05-31 @ 07:01  -  06-01 @ 07:00  --------------------------------------------------------  IN: 0 mL / OUT: 200 mL / NET: -200 mL    06-01 @ 07:01  -  06-02 @ 07:00  --------------------------------------------------------  IN: 1860 mL / OUT: 1150 mL / NET: 710 mL          PHYSICAL EXAM:  Constitutional: NAD  Respiratory: CTAB,  Cardiovascular: S1, S2, RRR  Gastrointestinal: BS+, soft, NT/ND  Extremities: No cyanosis or clubbing. No peripheral edema  :  No randall.   Skin: No rashes    LABS:  06-02    140  |  105  |  38<H>  ----------------------------<  134<H>  3.9   |  24  |  2.3<H>    Ca    9.4      02 Jun 2024 07:10  Phos  3.2     06-02  Mg     1.9     06-02    TPro  6.9  /  Alb  4.4  /  TBili  0.3  /  DBili  x   /  AST  18  /  ALT  16  /  AlkPhos  49  06-01    06-01    146  |  109  |  44<H>  ----------------------------<  105<H>  4.3   |  25  |  2.5<H>    Ca    9.6      01 Jun 2024 07:20  Phos  3.5     06-01  Mg     2.2     06-01    TPro  6.9  /  Alb  4.4  /  TBili  0.3  /  DBili      /  AST  18  /  ALT  16  /  AlkPhos  49  06-01                          10.7   6.84  )-----------( 212      ( 02 Jun 2024 07:10 )             32.8       Urine Studies:  Urinalysis Basic - ( 01 Jun 2024 07:20 )    Color:  / Appearance:  / SG:  / pH:   Gluc: 105 mg/dL / Ketone:   / Bili:  / Urobili:    Blood:  / Protein:  / Nitrite:    Leuk Esterase:  / RBC:  / WBC    Sq Epi:  / Non Sq Epi:  / Bacteria:       Osmolality, Random Urine: 469 mos/kg (06-01 @ 09:40)  Potassium, Random Urine: 24 mmol/L (06-01 @ 09:40)  Creatinine, Random Urine: 38 mg/dL (05-31 @ 20:06)  Protein/Creatinine Ratio Calculation: 2.6 Ratio (05-31 @ 20:06)  Sodium, Random Urine: 57.0 mmoL/L (05-31 @ 20:06)      PTH -- (Ca 9.3)      [06-01-24 @ 07:20]   40  TSH 1.27      [06-01-24 @ 07:20]          RADIOLOGY & ADDITIONAL STUDIES:  < from: US Kidney and Bladder (06.01.24 @ 17:43) >    No sonographic evidence of hydronephrosis.    Bilateral echogenic kidneys which can be seen with medical renal disease.    Multiple bilateral renal cysts.    < end of copied text >

## 2024-06-02 NOTE — PROGRESS NOTE ADULT - ASSESSMENT
74yoM hx htn, CAD, DM, CKD here for JOSE and RLE pain. Pt sent from PCP for abnormal creatinine.  ? baseline   creat better   please document UO   no hydro / echogenic kidneys on  renal and bladder sono   DC sodium bicarb  ph at goal   BP controlled   2.5 g of pr/ ? DKD / GN w/up pending baseline cr   h/h at goal   will need to resume ARB once cr at baseline   will benefit from [id number] before dc   will follow

## 2024-06-02 NOTE — PROGRESS NOTE ADULT - SUBJECTIVE AND OBJECTIVE BOX
AR GLEZ  74y  [facility] [address]      Patient is a 74y old  Male who presents with a chief complaint of JOSE on CKD (02 Jun 2024 07:56)      INTERVAL HPI/OVERNIGHT EVENTS:        REVIEW OF SYSTEMS:        FAMILY HISTORY:    T(C): 36.6 (06-02-24 @ 05:20), Max: 36.6 (06-01-24 @ 13:55)  HR: 77 (06-02-24 @ 05:20) (69 - 79)  BP: 176/79 (06-02-24 @ 05:20) (147/68 - 176/79)  RR: 18 (06-02-24 @ 05:20) (18 - 18)  SpO2: 97% (06-02-24 @ 05:20) (97% - 97%)  Wt(kg): --Vital Signs Last 24 Hrs  T(C): 36.6 (02 Jun 2024 05:20), Max: 36.6 (01 Jun 2024 13:55)  T(F): 97.9 (02 Jun 2024 05:20), Max: 97.9 (01 Jun 2024 13:55)  HR: 77 (02 Jun 2024 05:20) (69 - 79)  BP: 176/79 (02 Jun 2024 05:20) (147/68 - 176/79)  BP(mean): 111 (02 Jun 2024 05:20) (94 - 111)  RR: 18 (02 Jun 2024 05:20) (18 - 18)  SpO2: 97% (02 Jun 2024 05:20) (97% - 97%)    Parameters below as of 02 Jun 2024 05:20  Patient On (Oxygen Delivery Method): room air        PHYSICAL EXAM:  GENERAL: NAD, well-groomed, well-developed  HEAD:  Atraumatic, Normocephalic  EYES: EOMI, PERRLA, conjunctiva and sclera clear  ENMT: No tonsillar erythema, exudates, or enlargement; Moist mucous membranes, Good dentition, No lesions  NECK: Supple, No JVD, Normal thyroid  NERVOUS SYSTEM:  Alert & Oriented X3, Good concentration; Motor Strength 5/5 B/L upper and lower extremities; DTRs 2+ intact and symmetric  PULM: Clear to auscultation bilaterally  CARDIAC: Regular rate and rhythm; No murmurs, rubs, or gallops  GI: Soft, Nontender, Nondistended; Bowel sounds present  EXTREMITIES:  2+ Peripheral Pulses, No clubbing, cyanosis, or edema  LYMPH: No lymphadenopathy noted  SKIN: No rashes or lesions    Consultant(s) Notes Reviewed:  [x ] YES  [ ] NO  Care Discussed with Consultants/Other Providers [ x] YES  [ ] NO    LABS:                            10.7   6.84  )-----------( 212      ( 02 Jun 2024 07:10 )             32.8   06-02    140  |  105  |  38<H>  ----------------------------<  134<H>  3.9   |  24  |  2.3<H>    Ca    9.4      02 Jun 2024 07:10  Phos  3.2     06-02  Mg     1.9     06-02    TPro  6.9  /  Alb  4.4  /  TBili  0.3  /  DBili  x   /  AST  18  /  ALT  16  /  AlkPhos  49  06-01            Urinalysis with Rflx Culture (collected 31 May 2024 20:06)      acetaminophen     Tablet .. 650 milliGRAM(s) Oral every 6 hours PRN  allopurinol 100 milliGRAM(s) Oral daily  amLODIPine   Tablet 10 milliGRAM(s) Oral daily  atorvastatin 40 milliGRAM(s) Oral at bedtime  brimonidine 0.2% Ophthalmic Solution 1 Drop(s) Both EYES daily  clopidogrel Tablet 75 milliGRAM(s) Oral daily  dextrose 10% Bolus 125 milliLiter(s) IV Bolus once  dextrose 5%. 1000 milliLiter(s) IV Continuous <Continuous>  dextrose 5%. 1000 milliLiter(s) IV Continuous <Continuous>  dextrose 50% Injectable 25 Gram(s) IV Push once  dextrose 50% Injectable 12.5 Gram(s) IV Push once  dextrose Oral Gel 15 Gram(s) Oral once PRN  doxazosin 4 milliGRAM(s) Oral daily  glucagon  Injectable 1 milliGRAM(s) IntraMuscular once  heparin   Injectable 5000 Unit(s) SubCutaneous every 8 hours  hydrALAZINE 100 milliGRAM(s) Oral every 8 hours  insulin lispro (ADMELOG) corrective regimen sliding scale   SubCutaneous three times a day before meals  insulin lispro (ADMELOG) corrective regimen sliding scale   SubCutaneous at bedtime  lactated ringers. 1000 milliLiter(s) IV Continuous <Continuous>  pramipexole 0.125 milliGRAM(s) Oral at bedtime  prednisoLONE acetate 1% Suspension 1 Drop(s) Both EYES daily  sodium bicarbonate 650 milliGRAM(s) Oral three times a day          74 year old Male, with hx htn, CAD, DM, CKD here for JOSE and RLE pain.      1. JOSE  on top of ckd stage 4 improving   * seems there is a  chronic component as there is no metabolic acidosis or hyperkalemia  * Baseline creatinine: 1.8  Creatinine today 2.7 ,   - Avoid nephrotoxic agents,  - Holding losartan  - IVF with LR at 100ml/hr   - Renal US:  a) Bilateral echogenic kidneys which can be seen with medical renal disease.  b) Multiple bilateral renal cysts.  - Nephro consult appreciated     2. RLE pain associated with abn movement at night   * seems more restless leg syndrome   - Iron profile :WNL   - Started on  pramipexole HS   - Venous duplex: No DVT   - Right leg x-ray:WNL     3.Hx CAD/Hx Essential Hypertension  -confirm med rec, hydralazine and doxazosin for htn for now  - Hold  Losartan   -c/w Plavix    4. DM-II   * pt is on farxiga (SGLT2 inh) , rybelsus (GLP 1 agonist) , tradjenta (DDP4 inh)  and pioglitazone at home   - Hold po meds except farxiga   - Insulin protocol     5. DVT/GI px        AR GLEZ  74y  [facility] [address]      Patient is a 74y old  Male who presents with a chief complaint of JOSE on CKD (02 Jun 2024 07:56)      INTERVAL HPI/OVERNIGHT EVENTS:    Patient feels well.   he has no complains except constipation   no overnight events.         FAMILY HISTORY:    T(C): 36.6 (06-02-24 @ 05:20), Max: 36.6 (06-01-24 @ 13:55)  HR: 77 (06-02-24 @ 05:20) (69 - 79)  BP: 176/79 (06-02-24 @ 05:20) (147/68 - 176/79)  RR: 18 (06-02-24 @ 05:20) (18 - 18)  SpO2: 97% (06-02-24 @ 05:20) (97% - 97%)  Wt(kg): --Vital Signs Last 24 Hrs  T(C): 36.6 (02 Jun 2024 05:20), Max: 36.6 (01 Jun 2024 13:55)  T(F): 97.9 (02 Jun 2024 05:20), Max: 97.9 (01 Jun 2024 13:55)  HR: 77 (02 Jun 2024 05:20) (69 - 79)  BP: 176/79 (02 Jun 2024 05:20) (147/68 - 176/79)  BP(mean): 111 (02 Jun 2024 05:20) (94 - 111)  RR: 18 (02 Jun 2024 05:20) (18 - 18)  SpO2: 97% (02 Jun 2024 05:20) (97% - 97%)    Parameters below as of 02 Jun 2024 05:20  Patient On (Oxygen Delivery Method): room air        PHYSICAL EXAM:  GENERAL: NAD, well-groomed, well-developed  PULM: Clear to auscultation bilaterally  CARDIAC: Regular rate and rhythm;  GI: Soft, Nontender, Nondistended; Bowel sounds present  EXTREMITIES:  2+ Peripheral Pulses,     Consultant(s) Notes Reviewed:  [x ] YES  [ ] NO  Care Discussed with Consultants/Other Providers [ x] YES  [ ] NO    LABS:                            10.7   6.84  )-----------( 212      ( 02 Jun 2024 07:10 )             32.8   06-02    140  |  105  |  38<H>  ----------------------------<  134<H>  3.9   |  24  |  2.3<H>    Ca    9.4      02 Jun 2024 07:10  Phos  3.2     06-02  Mg     1.9     06-02    TPro  6.9  /  Alb  4.4  /  TBili  0.3  /  DBili  x   /  AST  18  /  ALT  16  /  AlkPhos  49  06-01            Urinalysis with Rflx Culture (collected 31 May 2024 20:06)      acetaminophen     Tablet .. 650 milliGRAM(s) Oral every 6 hours PRN  allopurinol 100 milliGRAM(s) Oral daily  amLODIPine   Tablet 10 milliGRAM(s) Oral daily  atorvastatin 40 milliGRAM(s) Oral at bedtime  brimonidine 0.2% Ophthalmic Solution 1 Drop(s) Both EYES daily  clopidogrel Tablet 75 milliGRAM(s) Oral daily  dextrose 10% Bolus 125 milliLiter(s) IV Bolus once  dextrose 5%. 1000 milliLiter(s) IV Continuous <Continuous>  dextrose 5%. 1000 milliLiter(s) IV Continuous <Continuous>  dextrose 50% Injectable 25 Gram(s) IV Push once  dextrose 50% Injectable 12.5 Gram(s) IV Push once  dextrose Oral Gel 15 Gram(s) Oral once PRN  doxazosin 4 milliGRAM(s) Oral daily  glucagon  Injectable 1 milliGRAM(s) IntraMuscular once  heparin   Injectable 5000 Unit(s) SubCutaneous every 8 hours  hydrALAZINE 100 milliGRAM(s) Oral every 8 hours  insulin lispro (ADMELOG) corrective regimen sliding scale   SubCutaneous three times a day before meals  insulin lispro (ADMELOG) corrective regimen sliding scale   SubCutaneous at bedtime  lactated ringers. 1000 milliLiter(s) IV Continuous <Continuous>  pramipexole 0.125 milliGRAM(s) Oral at bedtime  prednisoLONE acetate 1% Suspension 1 Drop(s) Both EYES daily  sodium bicarbonate 650 milliGRAM(s) Oral three times a day          74 year old Male, with hx htn, CAD, DM, CKD here for JOSE and RLE pain.      1. JOSE  on top of ckd stage 4 improving   * seems there is a  chronic component as there is no metabolic acidosis or hyperkalemia  * Baseline creatinine: 1.8  Creatinine today 2.7 ,   - Avoid nephrotoxic agents,  - Holding losartan  - IVF with LR at 100ml/hr   - Renal US:  a) Bilateral echogenic kidneys which can be seen with medical renal disease.  b) Multiple bilateral renal cysts.  - Nephro consult appreciated     2. RLE pain associated with abn movement at night improving   * seems more restless leg syndrome   - Iron profile :WNL   - Started on pramipexole HS   - Venous duplex: No DVT   - Right leg x-ray:WNL     3.Hx CAD/Hx Essential Hypertension  -confirm med rec, hydralazine and doxazosin for htn for now  - Hold  Losartan   -c/w Plavix    4. DM-II   * pt is on farxiga (SGLT2 inh) , rybelsus (GLP 1 agonist) , tradjenta (DDP4 inh)  and pioglitazone at home   - Hold po meds except farxiga   - Insulin protocol     5. Constipation   - Cont laxative    6. DVT/GI px

## 2024-06-03 VITALS
RESPIRATION RATE: 18 BRPM | DIASTOLIC BLOOD PRESSURE: 77 MMHG | HEART RATE: 66 BPM | TEMPERATURE: 98 F | SYSTOLIC BLOOD PRESSURE: 150 MMHG | OXYGEN SATURATION: 95 %

## 2024-06-03 LAB
ANION GAP SERPL CALC-SCNC: 13 MMOL/L — SIGNIFICANT CHANGE UP (ref 7–14)
BUN SERPL-MCNC: 40 MG/DL — HIGH (ref 10–20)
CA-I BLD-SCNC: 5.2 MG/DL — SIGNIFICANT CHANGE UP (ref 4.5–5.6)
CALCIUM SERPL-MCNC: 10.2 MG/DL — SIGNIFICANT CHANGE UP (ref 8.4–10.5)
CHLORIDE SERPL-SCNC: 103 MMOL/L — SIGNIFICANT CHANGE UP (ref 98–110)
CO2 SERPL-SCNC: 26 MMOL/L — SIGNIFICANT CHANGE UP (ref 17–32)
CREAT SERPL-MCNC: 2.5 MG/DL — HIGH (ref 0.7–1.5)
EGFR: 27 ML/MIN/1.73M2 — LOW
GLUCOSE BLDC GLUCOMTR-MCNC: 152 MG/DL — HIGH (ref 70–99)
GLUCOSE BLDC GLUCOMTR-MCNC: 157 MG/DL — HIGH (ref 70–99)
GLUCOSE SERPL-MCNC: 126 MG/DL — HIGH (ref 70–99)
HCT VFR BLD CALC: 33.9 % — LOW (ref 42–52)
HGB BLD-MCNC: 11.3 G/DL — LOW (ref 14–18)
IGA FLD-MCNC: 161 MG/DL — SIGNIFICANT CHANGE UP (ref 84–499)
IGG FLD-MCNC: 1062 MG/DL — SIGNIFICANT CHANGE UP (ref 610–1660)
IGM SERPL-MCNC: 99 MG/DL — SIGNIFICANT CHANGE UP (ref 35–242)
KAPPA LC SER QL IFE: 4.55 MG/DL — HIGH (ref 0.33–1.94)
KAPPA/LAMBDA FREE LIGHT CHAIN RATIO, SERUM: 1.13 RATIO — SIGNIFICANT CHANGE UP (ref 0.26–1.65)
LAMBDA LC SER QL IFE: 4.02 MG/DL — HIGH (ref 0.57–2.63)
MAGNESIUM SERPL-MCNC: 1.9 MG/DL — SIGNIFICANT CHANGE UP (ref 1.8–2.4)
MCHC RBC-ENTMCNC: 31 PG — SIGNIFICANT CHANGE UP (ref 27–31)
MCHC RBC-ENTMCNC: 33.3 G/DL — SIGNIFICANT CHANGE UP (ref 32–37)
MCV RBC AUTO: 92.9 FL — SIGNIFICANT CHANGE UP (ref 80–94)
NRBC # BLD: 0 /100 WBCS — SIGNIFICANT CHANGE UP (ref 0–0)
PHOSPHATE SERPL-MCNC: 3.7 MG/DL — SIGNIFICANT CHANGE UP (ref 2.1–4.9)
PLATELET # BLD AUTO: 248 K/UL — SIGNIFICANT CHANGE UP (ref 130–400)
PMV BLD: 10.8 FL — HIGH (ref 7.4–10.4)
POTASSIUM SERPL-MCNC: 4.3 MMOL/L — SIGNIFICANT CHANGE UP (ref 3.5–5)
POTASSIUM SERPL-SCNC: 4.3 MMOL/L — SIGNIFICANT CHANGE UP (ref 3.5–5)
RBC # BLD: 3.65 M/UL — LOW (ref 4.7–6.1)
RBC # FLD: 13.2 % — SIGNIFICANT CHANGE UP (ref 11.5–14.5)
SODIUM SERPL-SCNC: 142 MMOL/L — SIGNIFICANT CHANGE UP (ref 135–146)
WBC # BLD: 7.15 K/UL — SIGNIFICANT CHANGE UP (ref 4.8–10.8)
WBC # FLD AUTO: 7.15 K/UL — SIGNIFICANT CHANGE UP (ref 4.8–10.8)

## 2024-06-03 PROCEDURE — 99239 HOSP IP/OBS DSCHRG MGMT >30: CPT

## 2024-06-03 RX ORDER — LOSARTAN POTASSIUM 100 MG/1
100 TABLET, FILM COATED ORAL
Qty: 0 | Refills: 0 | DISCHARGE
Start: 2024-06-03

## 2024-06-03 RX ORDER — SODIUM BICARBONATE 1 MEQ/ML
2 SYRINGE (ML) INTRAVENOUS
Refills: 0 | DISCHARGE

## 2024-06-03 RX ORDER — DAPAGLIFLOZIN 10 MG/1
1 TABLET, FILM COATED ORAL
Qty: 0 | Refills: 0 | DISCHARGE
Start: 2024-06-03

## 2024-06-03 RX ORDER — LOSARTAN POTASSIUM 100 MG/1
1 TABLET, FILM COATED ORAL
Refills: 0 | DISCHARGE

## 2024-06-03 RX ORDER — PRAMIPEXOLE DIHYDROCHLORIDE 0.12 MG/1
1 TABLET ORAL
Qty: 30 | Refills: 0
Start: 2024-06-03 | End: 2024-07-02

## 2024-06-03 RX ADMIN — BRIMONIDINE TARTRATE 1 DROP(S): 2 SOLUTION/ DROPS OPHTHALMIC at 11:45

## 2024-06-03 RX ADMIN — Medication 1: at 08:14

## 2024-06-03 RX ADMIN — HEPARIN SODIUM 5000 UNIT(S): 5000 INJECTION INTRAVENOUS; SUBCUTANEOUS at 13:06

## 2024-06-03 RX ADMIN — Medication 4 MILLIGRAM(S): at 05:25

## 2024-06-03 RX ADMIN — Medication 100 MILLIGRAM(S): at 05:25

## 2024-06-03 RX ADMIN — Medication 100 MILLIGRAM(S): at 13:06

## 2024-06-03 RX ADMIN — Medication 1 DROP(S): at 11:44

## 2024-06-03 RX ADMIN — Medication 650 MILLIGRAM(S): at 05:26

## 2024-06-03 RX ADMIN — HEPARIN SODIUM 5000 UNIT(S): 5000 INJECTION INTRAVENOUS; SUBCUTANEOUS at 05:25

## 2024-06-03 RX ADMIN — CLOPIDOGREL BISULFATE 75 MILLIGRAM(S): 75 TABLET, FILM COATED ORAL at 11:43

## 2024-06-03 RX ADMIN — DAPAGLIFLOZIN 10 MILLIGRAM(S): 10 TABLET, FILM COATED ORAL at 11:43

## 2024-06-03 RX ADMIN — Medication 100 MILLIGRAM(S): at 11:43

## 2024-06-03 RX ADMIN — Medication 1: at 11:59

## 2024-06-03 RX ADMIN — AMLODIPINE BESYLATE 10 MILLIGRAM(S): 2.5 TABLET ORAL at 05:25

## 2024-06-03 NOTE — DISCHARGE NOTE NURSING/CASE MANAGEMENT/SOCIAL WORK - NSDCPEFALRISK_GEN_ALL_CORE
For information on Fall & Injury Prevention, visit: [email or web address] OR  [email or web address] OR  https://www.cdc.gov/steadi/patient.html

## 2024-06-03 NOTE — DISCHARGE NOTE PROVIDER - NSDCCPCAREPLAN_GEN_ALL_CORE_FT
PRINCIPAL DISCHARGE DIAGNOSIS  Diagnosis: Acute kidney injury superimposed on CKD  Assessment and Plan of Treatment: You were admitted to the hospital for an acute kidney injury on top of your chronic kidney injury. You were given IV fluids with some improvement in your kidney function.  We have monitored your kidney function with blood work during your time here and you are at a level that no longer requires continued hospital level care, but we do recommend that you follow up to continually have your kidney function checked. Your losartan was held while you were inpatient and will continued to be held on discharge until you follow up with your nephrologist.  You were also complaining of R leg pain while inpatient. You had imaging that showed no fracture and no acute clot in your leg. You were started pramipexole for possible restless leg syndrome. You are recommended to follow up with your primary care provider.  Call your healthcare provider right away if any of the following occur:  Signs of bladder infection, such as urinating more often, burning or pain when you pee, pain above your pubic bone, blood in your urine, or trouble starting your urine stream  Signs of infection around your catheter, such as redness, swelling, warmth, or fluid leaking  Rapid weight loss or weight gain, such as 3 pounds or more in 24 hours or 6 pounds or more in 7 days  Fever above 100.4° F ( 38°C ) or as directed by your healthcare provider  Chills  Muscle aches  Night sweats  Very little or no urine output  Swelling of your hands, legs, or feet  Back pain  Abdominal (belly) pain  Extreme tiredness

## 2024-06-03 NOTE — PROGRESS NOTE ADULT - SUBJECTIVE AND OBJECTIVE BOX
AR GLEZ  74y  [facility] [address] [address]      Patient is a 74y old  Male who presents with a chief complaint of JOSE on CKD (02 Jun 2024 07:56)      INTERVAL HPI/OVERNIGHT EVENTS:    Patient feels well.   he has no complains except constipation   no overnight events.         FAMILY HISTORY:    T(C): 36.6 (06-02-24 @ 05:20), Max: 36.6 (06-01-24 @ 13:55)  HR: 77 (06-02-24 @ 05:20) (69 - 79)  BP: 176/79 (06-02-24 @ 05:20) (147/68 - 176/79)  RR: 18 (06-02-24 @ 05:20) (18 - 18)  SpO2: 97% (06-02-24 @ 05:20) (97% - 97%)  Wt(kg): --Vital Signs Last 24 Hrs  T(C): 36.6 (02 Jun 2024 05:20), Max: 36.6 (01 Jun 2024 13:55)  T(F): 97.9 (02 Jun 2024 05:20), Max: 97.9 (01 Jun 2024 13:55)  HR: 77 (02 Jun 2024 05:20) (69 - 79)  BP: 176/79 (02 Jun 2024 05:20) (147/68 - 176/79)  BP(mean): 111 (02 Jun 2024 05:20) (94 - 111)  RR: 18 (02 Jun 2024 05:20) (18 - 18)  SpO2: 97% (02 Jun 2024 05:20) (97% - 97%)    Parameters below as of 02 Jun 2024 05:20  Patient On (Oxygen Delivery Method): room air        PHYSICAL EXAM:  GENERAL: NAD, well-groomed, well-developed  PULM: Clear to auscultation bilaterally  CARDIAC: Regular rate and rhythm;  GI: Soft, Nontender, Nondistended; Bowel sounds present  EXTREMITIES:  2+ Peripheral Pulses,     Consultant(s) Notes Reviewed:  [x ] YES  [ ] NO  Care Discussed with Consultants/Other Providers [ x] YES  [ ] NO    LABS:                            10.7   6.84  )-----------( 212      ( 02 Jun 2024 07:10 )             32.8   06-02    140  |  105  |  38<H>  ----------------------------<  134<H>  3.9   |  24  |  2.3<H>    Ca    9.4      02 Jun 2024 07:10  Phos  3.2     06-02  Mg     1.9     06-02    TPro  6.9  /  Alb  4.4  /  TBili  0.3  /  DBili  x   /  AST  18  /  ALT  16  /  AlkPhos  49  06-01            Urinalysis with Rflx Culture (collected 31 May 2024 20:06)      acetaminophen     Tablet .. 650 milliGRAM(s) Oral every 6 hours PRN  allopurinol 100 milliGRAM(s) Oral daily  amLODIPine   Tablet 10 milliGRAM(s) Oral daily  atorvastatin 40 milliGRAM(s) Oral at bedtime  brimonidine 0.2% Ophthalmic Solution 1 Drop(s) Both EYES daily  clopidogrel Tablet 75 milliGRAM(s) Oral daily  dextrose 10% Bolus 125 milliLiter(s) IV Bolus once  dextrose 5%. 1000 milliLiter(s) IV Continuous <Continuous>  dextrose 5%. 1000 milliLiter(s) IV Continuous <Continuous>  dextrose 50% Injectable 25 Gram(s) IV Push once  dextrose 50% Injectable 12.5 Gram(s) IV Push once  dextrose Oral Gel 15 Gram(s) Oral once PRN  doxazosin 4 milliGRAM(s) Oral daily  glucagon  Injectable 1 milliGRAM(s) IntraMuscular once  heparin   Injectable 5000 Unit(s) SubCutaneous every 8 hours  hydrALAZINE 100 milliGRAM(s) Oral every 8 hours  insulin lispro (ADMELOG) corrective regimen sliding scale   SubCutaneous three times a day before meals  insulin lispro (ADMELOG) corrective regimen sliding scale   SubCutaneous at bedtime  lactated ringers. 1000 milliLiter(s) IV Continuous <Continuous>  pramipexole 0.125 milliGRAM(s) Oral at bedtime  prednisoLONE acetate 1% Suspension 1 Drop(s) Both EYES daily  sodium bicarbonate 650 milliGRAM(s) Oral three times a day          74 year old Male, with hx htn, CAD, DM, CKD here for JOSE and RLE pain.      1. JOSE  on top of ckd stage 4 improving   * seems there is a  chronic component as there is no metabolic acidosis or hyperkalemia  * Baseline creatinine: 1.8  Creatinine today 2.7 ,   - Avoid nephrotoxic agents,  - Holding losartan  - IVF with LR at 100ml/hr   - Renal US:  a) Bilateral echogenic kidneys which can be seen with medical renal disease.  b) Multiple bilateral renal cysts.  - Nephro consult appreciated     2. RLE pain associated with abn movement at night improving   * seems more restless leg syndrome   - Iron profile :WNL   - Started on pramipexole HS   - Venous duplex: No DVT   - Right leg x-ray:WNL     3.Hx CAD/Hx Essential Hypertension  -confirm med rec, hydralazine and doxazosin for htn for now  - Hold  Losartan   -c/w Plavix    4. DM-II   * pt is on farxiga (SGLT2 inh) , rybelsus (GLP 1 agonist) , tradjenta (DDP4 inh)  and pioglitazone at home   - Hold po meds except farxiga   - Insulin protocol     5. Constipation   - Cont laxative    6. DVT/GI px

## 2024-06-03 NOTE — DISCHARGE NOTE NURSING/CASE MANAGEMENT/SOCIAL WORK - PATIENT PORTAL LINK FT
You can access the FollowMyHealth Patient Portal offered by [facility] by registering at the following website: [email or web address]. By joining [facility]’s FollowMyHealth portal, you will also be able to view your health information using other applications (apps) compatible with our system.

## 2024-06-03 NOTE — DISCHARGE NOTE PROVIDER - HOSPITAL COURSE
HPI:   74yoM hx htn, CAD, DM, CKD here for JOSE and RLE pain. Pt sent from PCP for abnormal creatinine, son to bring lab work in AM currently unable to give baseline value or most recent outpt lab values. When questioned, pt uncertain of lab vaules but said that he was told his function has been "level 4" for the past three months but is now "level 5". Pt also has been experiencing R calf pains and swelling for two days, denies falls, trauma.    Labs significant for Hg 10.7, MCV 94.1, Cr 2.7, BUN 49  Imaging: CXR low volume but clear, VA duplex LE negative for DVT per ED  EKst degree AVB, incomplete LBBB  Pt ordered for 1L NS bolus by ED    Hospital course:     1. JOSE  on top of ckd stage 4   * seems there is a  chronic component as there is no metabolic acidosis or hyperkalemia  * Baseline creatinine: 1.8 from outpt labs  - Avoid nephrotoxic agents  - Holding losartan, can resume on DC   - IVF with LR at 100ml/hr   - Renal US: Bilateral echogenic kidneys which can be seen with medical renal disease. Multiple bilateral renal cysts.  - Nephro recs appreciated     2. RLE pain associated with abn movement at night improving   * seems more restless leg syndrome   - Iron profile :WNL   - Started on pramipexole HS   - Venous duplex: No DVT   - Right leg x-ray:WNL     3.Hx CAD/Hx Essential Hypertension  - cont hydralazine and doxazosin  - resume losartan on dc   - c/w Plavix    4. DM-II   * pt is on farxiga (SGLT2 inh) , rybelsus (GLP 1 agonist) , tradjenta (DDP4 inh)  and pioglitazone at home   - Hold po meds except farxiga   - Insulin protocol     5. Constipation   - Cont laxative    Patient is medically stable for discharge. Needs close follow up with nephro, patient states has appointment on the .      HPI:   74yoM hx htn, CAD, DM, CKD here for JOSE and RLE pain. Pt sent from PCP for abnormal creatinine, son to bring lab work in AM currently unable to give baseline value or most recent outpt lab values. When questioned, pt uncertain of lab vaules but said that he was told his function has been "level 4" for the past three months but is now "level 5". Pt also has been experiencing R calf pains and swelling for two days, denies falls, trauma.    Labs significant for Hg 10.7, MCV 94.1, Cr 2.7, BUN 49  Imaging: CXR low volume but clear, VA duplex LE negative for DVT per ED  EKst degree AVB, incomplete LBBB  Pt ordered for 1L NS bolus by ED    Hospital course:     1. JOSE on top of ckd stage 4   * seems there is a  chronic component as there is no metabolic acidosis or hyperkalemia  * Baseline creatinine: 1.8 from outpt labs  - Avoid nephrotoxic agents  - Holding losartan  - IVF with LR at 100ml/hr   - Renal US: Bilateral echogenic kidneys which can be seen with medical renal disease. Multiple bilateral renal cysts.  - Nephro recs appreciated -> can hold bicarb, needs close nephro follow up as outpt      2. RLE pain associated with abn movement at night improving   * seems more restless leg syndrome   - Iron profile :WNL   - Started on pramipexole HS   - Venous duplex: No DVT   - Right leg x-ray:WNL     3.Hx CAD/Hx Essential Hypertension  - cont hydralazine and doxazosin  - losartan held while inpatient, continue to hold on dc until patient follows up with nephrologist   - c/w Plavix    4. DM-II   * pt is on farxiga (SGLT2 inh) , rybelsus (GLP 1 agonist) , tradjenta (DDP4 inh)  and pioglitazone at home   - Held po meds except farxiga   - Insulin protocol     5. Constipation   - patient had BM     Patient is medically stable for discharge. Needs close follow up with nephro, patient states has appointment on the . Will continue holding losartan until patient sees nephrologist.      HPI:   74yoM hx htn, CAD, DM, CKD here for JOSE and RLE pain. Pt sent from PCP for abnormal creatinine, son to bring lab work in AM currently unable to give baseline value or most recent outpt lab values. When questioned, pt uncertain of lab vaules but said that he was told his function has been "level 4" for the past three months but is now "level 5". Pt also has been experiencing R calf pains and swelling for two days, denies falls, trauma.    Labs significant for Hg 10.7, MCV 94.1, Cr 2.7, BUN 49  Imaging: CXR low volume but clear, VA duplex LE negative for DVT per ED  EKst degree AVB, incomplete LBBB  Pt ordered for 1L NS bolus by ED    Hospital course:     1. JOSE on top of ckd stage 4   * seems there is a  chronic component as there is no metabolic acidosis or hyperkalemia  * Baseline creatinine: 1.8 from outpt labs  - Avoid nephrotoxic agents  - Holding losartan  - IVF with LR at 100ml/hr   - Renal US: Bilateral echogenic kidneys which can be seen with medical renal disease. Multiple bilateral renal cysts.  - Nephro recs appreciated -> can hold bicarb, needs close nephro follow up as outpt      2. RLE pain associated with abn movement at night improving   * seems more restless leg syndrome   - Iron profile :WNL   - Started on pramipexole HS   - Venous duplex: No DVT   - Right leg x-ray:WNL     3.Hx CAD/Hx Essential Hypertension  - cont home meds   - losartan held while inpatient, continue to hold on dc until patient follows up with nephrologist   - c/w Plavix    4. DM-II   * pt is on farxiga (SGLT2 inh) , rybelsus (GLP 1 agonist) , tradjenta (DDP4 inh)  and pioglitazone at home   - Held po meds except farxiga   - Insulin protocol     5. Constipation   - patient had BM     Patient is medically stable for discharge. Needs close follow up with nephro, patient states has appointment on the . Will continue holding losartan until patient sees nephrologist.      HPI:   74yoM hx htn, CAD, DM, CKD here for JOSE and RLE pain. Pt sent from PCP for abnormal creatinine, son to bring lab work in AM currently unable to give baseline value or most recent outpt lab values. When questioned, pt uncertain of lab vaules but said that he was told his function has been "level 4" for the past three months but is now "level 5". Pt also has been experiencing R calf pains and swelling for two days, denies falls, trauma.    Labs significant for Hg 10.7, MCV 94.1, Cr 2.7, BUN 49  Imaging: CXR low volume but clear, VA duplex LE negative for DVT per ED  EKst degree AVB, incomplete LBBB  Pt ordered for 1L NS bolus by ED    Hospital course:     1. JOSE on top of ckd stage 4   * seems there is a  chronic component as there is no metabolic acidosis or hyperkalemia  * Baseline creatinine: 1.8 from outpt labs  - Avoid nephrotoxic agents  - Holding losartan until seen by nephrologist   - Was on IVF with LR at 100ml/hr   - Renal US: Bilateral echogenic kidneys which can be seen with medical renal disease. Multiple bilateral renal cysts.  - Nephro recs appreciated -> can hold bicarb, needs close nephro follow up as outpt  . DC on sodium bicarbonate tid     2. RLE pain associated with abn movement at night improving   * seems more restless leg syndrome   - Iron profile :WNL   - Started on pramipexole HS . DC on pramipexole low dose HS . Consider increasing in 4 days if no improvement  * had some improvement initially. stop if no improvement in 1 week  - Venous duplex: No DVT   - Right leg x-ray:WNL     3.Hx CAD/Hx Essential Hypertension  - cont home meds   - losartan held while inpatient, continue to hold on dc until patient follows up with nephrologist   - c/w Plavix    4. DM-II   * pt is on farxiga (SGLT2 inh) , rybelsus (GLP 1 agonist) , tradjenta (DDP4 inh)  and pioglitazone at home   - Cont home meds     5. Constipation   - patient had BM     Patient is medically stable for discharge. Needs close follow up with nephro, patient states has appointment on the . Will continue holding losartan until patient sees nephrologist.

## 2024-06-03 NOTE — PROGRESS NOTE ADULT - ASSESSMENT
74yoM hx htn, CAD, DM, CKD here for JOSE and RLE pain. Pt sent from PCP for abnormal creatinine.    creat noted / baseline around 1.8  no hydro / echogenic kidneys on  renal and bladder sono   DC sodium bicarb bicarb noted today   ph at goal   BP controlled   2.5 g of pr/ ? DKD / GN w/up pending baseline cr   can check SPEP UPEP SFLC and IF / can be done as OP   h/h at goal   can resume ARB on DC   cont SGLT 2inh   will follow

## 2024-06-03 NOTE — DISCHARGE NOTE PROVIDER - ATTENDING DISCHARGE PHYSICAL EXAMINATION:
VITALS:   T(C): 36.4 (06-03-24 @ 12:15), Max: 37 (06-02-24 @ 12:48)  HR: 66 (06-03-24 @ 12:15) (66 - 84)  BP: 150/77 (06-03-24 @ 12:15) (117/64 - 168/90)  RR: 18 (06-03-24 @ 12:15) (18 - 18)  SpO2: 95% (06-03-24 @ 12:15) (95% - 97%)    GENERAL: NAD, lying in bed comfortably  HEART: Regular rate and rhythm  LUNGS: Unlabored respirations.  Clear to auscultation bilaterally  ABDOMEN: Soft, nontender, nondistended, +BS  EXTREMITIES: 2+ peripheral pulses bilaterally.  NERVOUS SYSTEM:  A&Ox3,

## 2024-06-03 NOTE — DISCHARGE NOTE PROVIDER - CARE PROVIDER_API CALL
[facility] [address], Admin - Room [address] [place] [address]  Phone: [phone]  Fax: [phone]  Follow Up Time: 2 weeks

## 2024-06-03 NOTE — PROGRESS NOTE ADULT - SUBJECTIVE AND OBJECTIVE BOX
Nephrology progress note    THIS IS AN INCOMPLETE NOTE . FULL NOTE TO FOLLOW SHORTLY    Patient is seen and examined, events over the last 24 h noted .    Allergies:  No Known Allergies    Hospital Medications:   MEDICATIONS  (STANDING):  allopurinol 100 milliGRAM(s) Oral daily  amLODIPine   Tablet 10 milliGRAM(s) Oral daily  atorvastatin 40 milliGRAM(s) Oral at bedtime  brimonidine 0.2% Ophthalmic Solution 1 Drop(s) Both EYES daily  clopidogrel Tablet 75 milliGRAM(s) Oral daily  dapagliflozin 10 milliGRAM(s) Oral daily  dextrose 10% Bolus 125 milliLiter(s) IV Bolus once  dextrose 5%. 1000 milliLiter(s) (100 mL/Hr) IV Continuous <Continuous>  dextrose 5%. 1000 milliLiter(s) (50 mL/Hr) IV Continuous <Continuous>  dextrose 50% Injectable 25 Gram(s) IV Push once  dextrose 50% Injectable 12.5 Gram(s) IV Push once  doxazosin 4 milliGRAM(s) Oral daily  glucagon  Injectable 1 milliGRAM(s) IntraMuscular once  heparin   Injectable 5000 Unit(s) SubCutaneous every 8 hours  hydrALAZINE 100 milliGRAM(s) Oral every 8 hours  insulin lispro (ADMELOG) corrective regimen sliding scale   SubCutaneous three times a day before meals  insulin lispro (ADMELOG) corrective regimen sliding scale   SubCutaneous at bedtime  lactated ringers. 1000 milliLiter(s) (100 mL/Hr) IV Continuous <Continuous>  polyethylene glycol 3350 17 Gram(s) Oral daily  pramipexole 0.125 milliGRAM(s) Oral at bedtime  prednisoLONE acetate 1% Suspension 1 Drop(s) Both EYES daily  senna 2 Tablet(s) Oral at bedtime        VITALS:  T(F): 98 (06-03-24 @ 05:58), Max: 98.6 (06-02-24 @ 12:48)  HR: 80 (06-03-24 @ 05:58)  BP: 168/90 (06-03-24 @ 05:58)  RR: 18 (06-03-24 @ 05:58)  SpO2: 97% (06-03-24 @ 05:58)  Wt(kg): --    06-01 @ 07:01  -  06-02 @ 07:00  --------------------------------------------------------  IN: 1860 mL / OUT: 1150 mL / NET: 710 mL    06-02 @ 07:01  -  06-03 @ 07:00  --------------------------------------------------------  IN: 0 mL / OUT: 400 mL / NET: -400 mL          PHYSICAL EXAM:  Constitutional: NAD  HEENT: anicteric sclera, oropharynx clear, MMM  Neck: No JVD  Respiratory: CTAB, no wheezes, rales or rhonchi  Cardiovascular: S1, S2, RRR  Gastrointestinal: BS+, soft, NT/ND  Extremities: No cyanosis or clubbing. No peripheral edema  :  No randall.   Skin: No rashes    LABS:  06-03    142  |  103  |  40<H>  ----------------------------<  126<H>  4.3   |  26  |  2.5<H>  Creatinine Trend: 2.5<--, 2.3<--, 2.5<--, 2.7<--  Ca    10.2      03 Jun 2024 05:45  Phos  3.7     06-03  Mg     1.9     06-03                            11.3   7.15  )-----------( 248      ( 03 Jun 2024 05:45 )             33.9       Urine Studies:  Urinalysis Basic - ( 03 Jun 2024 05:45 )    Color:  / Appearance:  / SG:  / pH:   Gluc: 126 mg/dL / Ketone:   / Bili:  / Urobili:    Blood:  / Protein:  / Nitrite:    Leuk Esterase:  / RBC:  / WBC    Sq Epi:  / Non Sq Epi:  / Bacteria:       Osmolality, Random Urine: 469 mos/kg (06-01 @ 09:40)  Potassium, Random Urine: 24 mmol/L (06-01 @ 09:40)  Creatinine, Random Urine: 38 mg/dL (05-31 @ 20:06)  Protein/Creatinine Ratio Calculation: 2.6 Ratio (05-31 @ 20:06)  Sodium, Random Urine: 57.0 mmoL/L (05-31 @ 20:06)      Iron 112, TIBC 274, %sat 41      [06-02-24 @ 07:10]  PTH -- (Ca 9.3)      [06-01-24 @ 07:20]   40  TSH 1.27      [06-01-24 @ 07:20]          RADIOLOGY & ADDITIONAL STUDIES:   Nephrology progress note  Patient is seen and examined, events over the last 24 h noted .  lying in bed     Allergies:  No Known Allergies    Hospital Medications:   MEDICATIONS  (STANDING):  allopurinol 100 milliGRAM(s) Oral daily  amLODIPine   Tablet 10 milliGRAM(s) Oral daily  atorvastatin 40 milliGRAM(s) Oral at bedtime  brimonidine 0.2% Ophthalmic Solution 1 Drop(s) Both EYES daily  clopidogrel Tablet 75 milliGRAM(s) Oral daily  dapagliflozin 10 milliGRAM(s) Oral daily  doxazosin 4 milliGRAM(s) Oral daily  glucagon  Injectable 1 milliGRAM(s) IntraMuscular once  heparin   Injectable 5000 Unit(s) SubCutaneous every 8 hours  hydrALAZINE 100 milliGRAM(s) Oral every 8 hours  insulin lispro (ADMELOG) corrective regimen sliding scale   SubCutaneous three times a day before meals  insulin lispro (ADMELOG) corrective regimen sliding scale   SubCutaneous at bedtime  lactated ringers. 1000 milliLiter(s) (100 mL/Hr) IV Continuous <Continuous>  polyethylene glycol 3350 17 Gram(s) Oral daily  pramipexole 0.125 milliGRAM(s) Oral at bedtime  prednisoLONE acetate 1% Suspension 1 Drop(s) Both EYES daily  senna 2 Tablet(s) Oral at bedtime        VITALS:  T(F): 98 (06-03-24 @ 05:58), Max: 98.6 (06-02-24 @ 12:48)  HR: 80 (06-03-24 @ 05:58)  BP: 168/90 (06-03-24 @ 05:58)  RR: 18 (06-03-24 @ 05:58)  SpO2: 97% (06-03-24 @ 05:58)      06-01 @ 07:01  -  06-02 @ 07:00  --------------------------------------------------------  IN: 1860 mL / OUT: 1150 mL / NET: 710 mL    06-02 @ 07:01  -  06-03 @ 07:00  --------------------------------------------------------  IN: 0 mL / OUT: 400 mL / NET: -400 mL          PHYSICAL EXAM:  Constitutional: NAD  Respiratory: CTAB, no wheezes, rales or rhonchi  Cardiovascular: S1, S2, RRR  Gastrointestinal: BS+, soft, NT/ND  Extremities: No cyanosis or clubbing. No peripheral edema  :  No randall.   Skin: No rashes    LABS:  06-03    142  |  103  |  40<H>  ----------------------------<  126<H>  4.3   |  26  |  2.5<H>    Creatinine Trend: 2.5<--, 2.3<--, 2.5<--, 2.7<--    Ca    10.2      03 Jun 2024 05:45  Phos  3.7     06-03  Mg     1.9     06-03                            11.3   7.15  )-----------( 248      ( 03 Jun 2024 05:45 )             33.9       Urine Studies:  Urinalysis Basic - ( 03 Jun 2024 05:45 )    Color:  / Appearance:  / SG:  / pH:   Gluc: 126 mg/dL / Ketone:   / Bili:  / Urobili:    Blood:  / Protein:  / Nitrite:    Leuk Esterase:  / RBC:  / WBC    Sq Epi:  / Non Sq Epi:  / Bacteria:       Osmolality, Random Urine: 469 mos/kg (06-01 @ 09:40)  Potassium, Random Urine: 24 mmol/L (06-01 @ 09:40)  Creatinine, Random Urine: 38 mg/dL (05-31 @ 20:06)  Protein/Creatinine Ratio Calculation: 2.6 Ratio (05-31 @ 20:06)  Sodium, Random Urine: 57.0 mmoL/L (05-31 @ 20:06)      Iron 112, TIBC 274, %sat 41      [06-02-24 @ 07:10]  PTH -- (Ca 9.3)      [06-01-24 @ 07:20]   40  TSH 1.27      [06-01-24 @ 07:20]          RADIOLOGY & ADDITIONAL STUDIES:

## 2024-06-03 NOTE — DISCHARGE NOTE PROVIDER - NSDCMRMEDTOKEN_GEN_ALL_CORE_FT
allopurinol 100 mg oral tablet: 1 tab(s) orally once a day  amLODIPine 10 mg oral tablet: 1 tab(s) orally once a day  atorvastatin 40 mg oral tablet: 1 tab(s) orally once a day (at bedtime)  brimonidine 0.15% ophthalmic solution: 1 drop(s) in each affected eye once a day  doxazosin 4 mg oral tablet: 1 tab(s) orally once a day  hydrALAZINE 100 mg oral tablet: 1 tab(s) orally 3 times a day  isosorbide dinitrate 40 mg oral tablet: 3 tab(s) orally once a day  losartan 100 mg oral tablet: 1 tab(s) orally once a day  nebivolol 10 mg oral tablet: 1 tab(s) orally once a day  pioglitazone 30 mg oral tablet: 1 tab(s) orally once a day  Plavix 75 mg oral tablet: 1 tab(s) orally once a day  Prednisol 1% ophthalmic solution: 1 drop(s) in each affected eye once a day  Rybelsus 3 mg oral tablet: 1 tab(s) orally once a day  sodium bicarbonate 325 mg oral tablet: 2 tab(s) orally 3 times a day  Tradjenta 5 mg oral tablet: 1 tab(s) orally once a day   allopurinol 100 mg oral tablet: 1 tab(s) orally once a day  amLODIPine 10 mg oral tablet: 1 tab(s) orally once a day  atorvastatin 40 mg oral tablet: 1 tab(s) orally once a day (at bedtime)  brimonidine 0.15% ophthalmic solution: 1 drop(s) in each affected eye once a day  dapagliflozin 10 mg oral tablet: 1 tab(s) orally once a day  doxazosin 4 mg oral tablet: 1 tab(s) orally once a day  hydrALAZINE 100 mg oral tablet: 1 tab(s) orally 3 times a day  isosorbide dinitrate 40 mg oral tablet: 3 tab(s) orally once a day  nebivolol 10 mg oral tablet: 1 tab(s) orally once a day  pioglitazone 30 mg oral tablet: 1 tab(s) orally once a day  Plavix 75 mg oral tablet: 1 tab(s) orally once a day  pramipexole 0.125 mg oral tablet: 1 tab(s) orally once a day (at bedtime)  Prednisol 1% ophthalmic solution: 1 drop(s) in each affected eye once a day  Rybelsus 3 mg oral tablet: 1 tab(s) orally once a day  Tradjenta 5 mg oral tablet: 1 tab(s) orally once a day   allopurinol 100 mg oral tablet: 1 tab(s) orally once a day  amLODIPine 10 mg oral tablet: 1 tab(s) orally once a day  atorvastatin 40 mg oral tablet: 1 tab(s) orally once a day (at bedtime)  brimonidine 0.15% ophthalmic solution: 1 drop(s) in each affected eye once a day  Cozaar 100 mg oral tablet: 100 milligram(s) orally once a day hold until seen by nephrologist  dapagliflozin 10 mg oral tablet: 1 tab(s) orally once a day  doxazosin 4 mg oral tablet: 1 tab(s) orally once a day  hydrALAZINE 100 mg oral tablet: 1 tab(s) orally 3 times a day  isosorbide dinitrate 40 mg oral tablet: 3 tab(s) orally once a day  nebivolol 10 mg oral tablet: 1 tab(s) orally once a day  pioglitazone 30 mg oral tablet: 1 tab(s) orally once a day  Plavix 75 mg oral tablet: 1 tab(s) orally once a day  pramipexole 0.125 mg oral tablet: 1 tab(s) orally once a day (at bedtime)  Prednisol 1% ophthalmic solution: 1 drop(s) in each affected eye once a day  Rybelsus 3 mg oral tablet: 1 tab(s) orally once a day  Tradjenta 5 mg oral tablet: 1 tab(s) orally once a day

## 2024-06-05 LAB
% ALBUMIN: 59.4 % — SIGNIFICANT CHANGE UP
% ALPHA 1: 4.2 % — SIGNIFICANT CHANGE UP
% ALPHA 2: 10.4 % — SIGNIFICANT CHANGE UP
% BETA: 10.9 % — SIGNIFICANT CHANGE UP
% GAMMA: 15.1 % — SIGNIFICANT CHANGE UP
ALBUMIN SERPL ELPH-MCNC: 3.9 G/DL — SIGNIFICANT CHANGE UP (ref 3.6–5.5)
ALBUMIN/GLOB SERPL ELPH: 1.4 RATIO — SIGNIFICANT CHANGE UP
ALPHA1 GLOB SERPL ELPH-MCNC: 0.3 G/DL — SIGNIFICANT CHANGE UP (ref 0.1–0.4)
ALPHA2 GLOB SERPL ELPH-MCNC: 0.7 G/DL — SIGNIFICANT CHANGE UP (ref 0.5–1)
B-GLOBULIN SERPL ELPH-MCNC: 0.7 G/DL — SIGNIFICANT CHANGE UP (ref 0.5–1)
GAMMA GLOBULIN: 1 G/DL — SIGNIFICANT CHANGE UP (ref 0.6–1.6)
INTERPRETATION SERPL IFE-IMP: SIGNIFICANT CHANGE UP
PROT PATTERN SERPL ELPH-IMP: SIGNIFICANT CHANGE UP
PROT SERPL-MCNC: 6.6 G/DL — SIGNIFICANT CHANGE UP (ref 6–8.3)

## 2025-03-31 NOTE — DISCHARGE NOTE PROVIDER - POSTFACE STATEMENT FOR MINUTES SPENT
minutes on the discharge service.
clear to auscultation bilaterally/no wheezes/no rales/no rhonchi/no respiratory distress